# Patient Record
Sex: FEMALE | Race: WHITE | NOT HISPANIC OR LATINO | Employment: UNEMPLOYED | URBAN - METROPOLITAN AREA
[De-identification: names, ages, dates, MRNs, and addresses within clinical notes are randomized per-mention and may not be internally consistent; named-entity substitution may affect disease eponyms.]

---
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
## 2017-01-12 ENCOUNTER — ALLSCRIPTS OFFICE VISIT (OUTPATIENT)
Dept: OTHER | Facility: OTHER | Age: 22
End: 2017-01-12

## 2017-04-10 ENCOUNTER — ALLSCRIPTS OFFICE VISIT (OUTPATIENT)
Dept: OTHER | Facility: OTHER | Age: 22
End: 2017-04-10

## 2017-05-30 ENCOUNTER — HOSPITAL ENCOUNTER (EMERGENCY)
Facility: HOSPITAL | Age: 22
Discharge: HOME | End: 2017-05-30
Attending: EMERGENCY MEDICINE
Payer: COMMERCIAL

## 2017-05-30 ENCOUNTER — APPOINTMENT (EMERGENCY)
Dept: RADIOLOGY | Facility: HOSPITAL | Age: 22
End: 2017-05-30
Payer: COMMERCIAL

## 2017-05-30 VITALS
RESPIRATION RATE: 18 BRPM | SYSTOLIC BLOOD PRESSURE: 133 MMHG | BODY MASS INDEX: 25.96 KG/M2 | WEIGHT: 151.24 LBS | OXYGEN SATURATION: 99 % | DIASTOLIC BLOOD PRESSURE: 95 MMHG | HEART RATE: 85 BPM | TEMPERATURE: 97.6 F

## 2017-05-30 DIAGNOSIS — R11.2 NAUSEA VOMITING AND DIARRHEA: Primary | ICD-10-CM

## 2017-05-30 DIAGNOSIS — R19.7 NAUSEA VOMITING AND DIARRHEA: Primary | ICD-10-CM

## 2017-05-30 DIAGNOSIS — R10.33 ABDOMINAL PAIN, ACUTE, PERIUMBILICAL: ICD-10-CM

## 2017-05-30 DIAGNOSIS — N39.0 UTI (URINARY TRACT INFECTION): ICD-10-CM

## 2017-05-30 LAB
ALBUMIN SERPL BCP-MCNC: 4.5 G/DL (ref 3.5–5)
ALP SERPL-CCNC: 81 U/L (ref 46–116)
ALT SERPL W P-5'-P-CCNC: 22 U/L (ref 12–78)
AMORPH URATE CRY URNS QL MICRO: ABNORMAL /HPF
ANION GAP SERPL CALCULATED.3IONS-SCNC: 15 MMOL/L (ref 4–13)
AST SERPL W P-5'-P-CCNC: 17 U/L (ref 5–45)
BACTERIA UR QL AUTO: ABNORMAL /HPF
BASOPHILS # BLD AUTO: 0.02 THOUSANDS/ΜL (ref 0–0.1)
BASOPHILS NFR BLD AUTO: 0 % (ref 0–1)
BILIRUB SERPL-MCNC: 1.4 MG/DL (ref 0.2–1)
BILIRUB UR QL STRIP: ABNORMAL
BUN SERPL-MCNC: 24 MG/DL (ref 5–25)
CALCIUM SERPL-MCNC: 9.4 MG/DL (ref 8.3–10.1)
CHLORIDE SERPL-SCNC: 102 MMOL/L (ref 100–108)
CLARITY UR: ABNORMAL
CO2 SERPL-SCNC: 21 MMOL/L (ref 21–32)
COLOR UR: YELLOW
CREAT SERPL-MCNC: 1.01 MG/DL (ref 0.6–1.3)
EOSINOPHIL # BLD AUTO: 0.08 THOUSAND/ΜL (ref 0–0.61)
EOSINOPHIL NFR BLD AUTO: 1 % (ref 0–6)
ERYTHROCYTE [DISTWIDTH] IN BLOOD BY AUTOMATED COUNT: 12.9 % (ref 11.6–15.1)
GFR SERPL CREATININE-BSD FRML MDRD: >60 ML/MIN/1.73SQ M
GLUCOSE SERPL-MCNC: 130 MG/DL (ref 65–140)
GLUCOSE UR STRIP-MCNC: NEGATIVE MG/DL
HCG UR QL: NEGATIVE
HCT VFR BLD AUTO: 43.1 % (ref 34.8–46.1)
HGB BLD-MCNC: 15.5 G/DL (ref 11.5–15.4)
HGB UR QL STRIP.AUTO: NEGATIVE
KETONES UR STRIP-MCNC: ABNORMAL MG/DL
LEUKOCYTE ESTERASE UR QL STRIP: ABNORMAL
LIPASE SERPL-CCNC: 94 U/L (ref 73–393)
LYMPHOCYTES # BLD AUTO: 0.82 THOUSANDS/ΜL (ref 0.6–4.47)
LYMPHOCYTES NFR BLD AUTO: 5 % (ref 14–44)
MCH RBC QN AUTO: 30.2 PG (ref 26.8–34.3)
MCHC RBC AUTO-ENTMCNC: 36 G/DL (ref 31.4–37.4)
MCV RBC AUTO: 84 FL (ref 82–98)
MONOCYTES # BLD AUTO: 0.8 THOUSAND/ΜL (ref 0.17–1.22)
MONOCYTES NFR BLD AUTO: 5 % (ref 4–12)
NEUTROPHILS # BLD AUTO: 13.8 THOUSANDS/ΜL (ref 1.85–7.62)
NEUTS SEG NFR BLD AUTO: 89 % (ref 43–75)
NITRITE UR QL STRIP: NEGATIVE
NON-SQ EPI CELLS URNS QL MICRO: ABNORMAL /HPF
PH UR STRIP.AUTO: 5.5 [PH] (ref 4.5–8)
PLATELET # BLD AUTO: 224 THOUSANDS/UL (ref 149–390)
PMV BLD AUTO: 10.3 FL (ref 8.9–12.7)
POTASSIUM SERPL-SCNC: 3.5 MMOL/L (ref 3.5–5.3)
PROT SERPL-MCNC: 8 G/DL (ref 6.4–8.2)
PROT UR STRIP-MCNC: ABNORMAL MG/DL
RBC # BLD AUTO: 5.14 MILLION/UL (ref 3.81–5.12)
RBC #/AREA URNS AUTO: ABNORMAL /HPF
SODIUM SERPL-SCNC: 138 MMOL/L (ref 136–145)
SP GR UR STRIP.AUTO: >=1.03 (ref 1–1.03)
UROBILINOGEN UR QL STRIP.AUTO: 1 E.U./DL
WBC # BLD AUTO: 15.52 THOUSAND/UL (ref 4.31–10.16)
WBC #/AREA URNS AUTO: ABNORMAL /HPF

## 2017-05-30 PROCEDURE — 81001 URINALYSIS AUTO W/SCOPE: CPT | Performed by: EMERGENCY MEDICINE

## 2017-05-30 PROCEDURE — 80053 COMPREHEN METABOLIC PANEL: CPT | Performed by: EMERGENCY MEDICINE

## 2017-05-30 PROCEDURE — 83690 ASSAY OF LIPASE: CPT | Performed by: EMERGENCY MEDICINE

## 2017-05-30 PROCEDURE — 36415 COLL VENOUS BLD VENIPUNCTURE: CPT | Performed by: EMERGENCY MEDICINE

## 2017-05-30 PROCEDURE — 81025 URINE PREGNANCY TEST: CPT | Performed by: EMERGENCY MEDICINE

## 2017-05-30 PROCEDURE — 74022 RADEX COMPL AQT ABD SERIES: CPT

## 2017-05-30 PROCEDURE — 99284 EMERGENCY DEPT VISIT MOD MDM: CPT

## 2017-05-30 PROCEDURE — 87086 URINE CULTURE/COLONY COUNT: CPT | Performed by: EMERGENCY MEDICINE

## 2017-05-30 PROCEDURE — 96361 HYDRATE IV INFUSION ADD-ON: CPT

## 2017-05-30 PROCEDURE — 85025 COMPLETE CBC W/AUTO DIFF WBC: CPT | Performed by: EMERGENCY MEDICINE

## 2017-05-30 PROCEDURE — 96374 THER/PROPH/DIAG INJ IV PUSH: CPT

## 2017-05-30 RX ORDER — ONDANSETRON 2 MG/ML
4 INJECTION INTRAMUSCULAR; INTRAVENOUS ONCE
Status: COMPLETED | OUTPATIENT
Start: 2017-05-30 | End: 2017-05-30

## 2017-05-30 RX ORDER — DICYCLOMINE HCL 20 MG
40 TABLET ORAL ONCE
Status: COMPLETED | OUTPATIENT
Start: 2017-05-30 | End: 2017-05-30

## 2017-05-30 RX ORDER — DICYCLOMINE HCL 20 MG
20 TABLET ORAL 2 TIMES DAILY
Qty: 20 TABLET | Refills: 0 | Status: SHIPPED | OUTPATIENT
Start: 2017-05-30 | End: 2019-05-20

## 2017-05-30 RX ORDER — SULFAMETHOXAZOLE AND TRIMETHOPRIM 800; 160 MG/1; MG/1
1 TABLET ORAL 2 TIMES DAILY
Qty: 14 TABLET | Refills: 0 | Status: SHIPPED | OUTPATIENT
Start: 2017-05-30 | End: 2017-06-06

## 2017-05-30 RX ORDER — ONDANSETRON 4 MG/1
4 TABLET, ORALLY DISINTEGRATING ORAL EVERY 8 HOURS PRN
Qty: 20 TABLET | Refills: 0 | Status: SHIPPED | OUTPATIENT
Start: 2017-05-30 | End: 2019-02-05

## 2017-05-30 RX ADMIN — SODIUM CHLORIDE 1000 ML: 0.9 INJECTION, SOLUTION INTRAVENOUS at 01:44

## 2017-05-30 RX ADMIN — DICYCLOMINE HYDROCHLORIDE 40 MG: 20 TABLET ORAL at 01:43

## 2017-05-30 RX ADMIN — ONDANSETRON 4 MG: 2 INJECTION INTRAMUSCULAR; INTRAVENOUS at 01:44

## 2017-06-01 LAB — BACTERIA UR CULT: NORMAL

## 2018-01-12 NOTE — MISCELLANEOUS
Provider Comments  Provider Comments:   2nd no show  Left msg re: office policy and need to reschedule        Signatures   Electronically signed by : Donald Morillo MD; Jan 12 2017  3:29PM EST                       (Author)

## 2018-01-15 NOTE — MISCELLANEOUS
Provider Comments  Provider Comments:     3rd no show despite verbal reminders  Will call, clarify reason for no show and discuss office policy and need to reschedule  WIll also send letter  Another no show will result in discharge from practice          Signatures   Electronically signed by : Michael Paul MD; Apr 11 2017  9:15AM EST                       (Author)

## 2018-01-18 NOTE — MISCELLANEOUS
Provider Comments  Provider Comments:   No show  Left msg re: office policy and need to reschedule        Signatures   Electronically signed by : Nohemi Hugo MD; Aug 25 2016  1:40PM EST                       (Author)

## 2018-07-03 ENCOUNTER — TELEPHONE (OUTPATIENT)
Dept: INTERNAL MEDICINE CLINIC | Facility: CLINIC | Age: 23
End: 2018-07-03

## 2018-09-29 ENCOUNTER — OFFICE VISIT (OUTPATIENT)
Dept: URGENT CARE | Facility: CLINIC | Age: 23
End: 2018-09-29
Payer: COMMERCIAL

## 2018-09-29 VITALS
DIASTOLIC BLOOD PRESSURE: 71 MMHG | OXYGEN SATURATION: 97 % | RESPIRATION RATE: 16 BRPM | SYSTOLIC BLOOD PRESSURE: 122 MMHG | HEIGHT: 64 IN | HEART RATE: 88 BPM | TEMPERATURE: 98.7 F | WEIGHT: 140 LBS | BODY MASS INDEX: 23.9 KG/M2

## 2018-09-29 DIAGNOSIS — J20.9 ACUTE BRONCHITIS, UNSPECIFIED ORGANISM: Primary | ICD-10-CM

## 2018-09-29 PROCEDURE — G0382 LEV 3 HOSP TYPE B ED VISIT: HCPCS | Performed by: PHYSICIAN ASSISTANT

## 2018-09-29 PROCEDURE — 99283 EMERGENCY DEPT VISIT LOW MDM: CPT | Performed by: PHYSICIAN ASSISTANT

## 2018-09-29 RX ORDER — AZITHROMYCIN 250 MG/1
TABLET, FILM COATED ORAL
Qty: 6 TABLET | Refills: 0 | Status: SHIPPED | OUTPATIENT
Start: 2018-09-29 | End: 2018-10-03

## 2018-09-29 RX ORDER — BUPROPION HYDROCHLORIDE 150 MG/1
TABLET, EXTENDED RELEASE ORAL
COMMUNITY
Start: 2016-07-26 | End: 2019-05-20

## 2018-09-29 NOTE — PATIENT INSTRUCTIONS

## 2018-09-29 NOTE — PROGRESS NOTES
[facility]        NAME: Lisette Colin is a 20 y o  female  : 1995    MRN: [id number]  DATE: 2018  TIME: 2:43 PM    Assessment and Plan   Acute bronchitis, unspecified organism [J20 9]  1  Acute bronchitis, unspecified organism  azithromycin (ZITHROMAX) 250 mg tablet         Patient Instructions       Follow up with PCP in 3-5 days  Proceed to  ER if symptoms worsen  Chief Complaint     Chief Complaint   Patient presents with    Cough     x 3 weeks    Cold Like Symptoms         History of Present Illness       Patient states she has been having cold symptoms off and on for the past 3 weeks  Yesterday she developed a cough which is now productive of yellow greenish mucus as chest pain with coughing only and feels as chest tightness  She states she has bronchitis usually run October every year  Review of Systems   Review of Systems   Constitutional: Negative for chills and fever  HENT: Positive for congestion  Negative for ear pain, postnasal drip, rhinorrhea, sinus pressure, sore throat and trouble swallowing  Eyes: Negative for redness  Respiratory: Positive for cough and chest tightness  Negative for shortness of breath and wheezing  Cardiovascular: Positive for chest pain (Coughing only)  Gastrointestinal: Negative for abdominal pain, diarrhea, nausea and vomiting  Musculoskeletal: Negative for myalgias  Skin: Negative for rash  Neurological: Negative for dizziness and headaches  Hematological: Negative for adenopathy           Current Medications       Current Outpatient Prescriptions:     azithromycin (ZITHROMAX) 250 mg tablet, Take 2 tablets today then 1 tablet daily x 4 days, Disp: 6 tablet, Rfl: 0    buPROPion (ZYBAN) 150 MG 12 hr tablet, Take by mouth, Disp: , Rfl:     dicyclomine (BENTYL) 20 mg tablet, Take 1 tablet by mouth 2 (two) times a day (Patient not taking: Reported on 2018 ), Disp: 20 tablet, Rfl: 0    ondansetron (ZOFRAN-ODT) 4 mg disintegrating tablet, Take 1 tablet by mouth every 8 (eight) hours as needed for nausea or vomiting (Patient not taking: Reported on 9/29/2018 ), Disp: 20 tablet, Rfl: 0    Current Allergies     Allergies as of 09/29/2018 - Reviewed 09/29/2018   Allergen Reaction Noted    Morphine and related Hives 10/09/2016            The following portions of the patient's history were reviewed and updated as appropriate: allergies, current medications, past family history, past medical history, past social history, past surgical history and problem list      Past Medical History:   Diagnosis Date    [facility]     age 7       Past Surgical History:   Procedure Laterality Date    ELBOW SURGERY      TONSILECTOMY AND ADNOIDECTOMY         History reviewed  No pertinent family history  Medications have been verified  Objective   /71   Pulse 88   Temp 98 7 °F (37 1 °C)   Resp 16   Ht 5' 4" (1 626 m)   Wt 63 5 kg (140 lb)   SpO2 97%   BMI 24 03 kg/m²        Physical Exam     Physical Exam   Constitutional: She is oriented to person, place, and time  She appears well-developed and well-nourished  HENT:   Head: Normocephalic and atraumatic  Right Ear: External ear normal    Left Ear: External ear normal    Nose: Nose normal    Mouth/Throat: Oropharynx is clear and moist    Eyes: Conjunctivae are normal    Neck: Neck supple  Cardiovascular: Normal rate, regular rhythm and normal heart sounds  Pulmonary/Chest: Effort normal    Lymphadenopathy:     She has no cervical adenopathy  Neurological: She is alert and oriented to person, place, and time  Skin: Skin is warm and dry  No rash noted  Psychiatric: She has a normal mood and affect  Her behavior is normal  Judgment and thought content normal    Nursing note and vitals reviewed

## 2019-01-30 ENCOUNTER — OFFICE VISIT (OUTPATIENT)
Dept: URGENT CARE | Facility: CLINIC | Age: 24
End: 2019-01-30
Payer: COMMERCIAL

## 2019-01-30 VITALS
OXYGEN SATURATION: 99 % | SYSTOLIC BLOOD PRESSURE: 132 MMHG | BODY MASS INDEX: 23.9 KG/M2 | TEMPERATURE: 97.8 F | WEIGHT: 140 LBS | HEART RATE: 86 BPM | DIASTOLIC BLOOD PRESSURE: 75 MMHG | RESPIRATION RATE: 16 BRPM | HEIGHT: 64 IN

## 2019-01-30 DIAGNOSIS — J20.9 ACUTE BRONCHITIS, UNSPECIFIED ORGANISM: Primary | ICD-10-CM

## 2019-01-30 PROCEDURE — 99213 OFFICE O/P EST LOW 20 MIN: CPT | Performed by: PHYSICIAN ASSISTANT

## 2019-01-30 PROCEDURE — 99283 EMERGENCY DEPT VISIT LOW MDM: CPT | Performed by: PHYSICIAN ASSISTANT

## 2019-01-30 PROCEDURE — G0382 LEV 3 HOSP TYPE B ED VISIT: HCPCS | Performed by: PHYSICIAN ASSISTANT

## 2019-01-30 RX ORDER — ALBUTEROL SULFATE 90 UG/1
2 AEROSOL, METERED RESPIRATORY (INHALATION) EVERY 4 HOURS PRN
Qty: 18 G | Refills: 0 | Status: SHIPPED | OUTPATIENT
Start: 2019-01-30 | End: 2019-02-05

## 2019-01-30 NOTE — PATIENT INSTRUCTIONS
Infection appears viral   Recommend symptomatic treatment  Can take ibuprofen or tylenol as needed for pain or fever  Over the counter cough and cold medications to help with symptoms  Use salt water gargles for sore throat and throat lozenges  Cough drops as needed  Wash hands frequently to prevent the spread of infection  If not improving over the next 7-10 days, follow up with PCP  Symptoms may persist for 10-14 days  Can use albuterol inhaler as needed

## 2019-01-30 NOTE — PROGRESS NOTES
[facility]    NAME: Murray Jasso is a 24 y o  female  : 1995    MRN: [id number]  DATE: 2019  TIME: 3:55 PM    Assessment and Plan   Acute bronchitis, unspecified organism [J20 9]  1  Acute bronchitis, unspecified organism  albuterol (VENTOLIN HFA) 90 mcg/act inhaler       Patient Instructions     Patient Instructions   Infection appears viral   Recommend symptomatic treatment  Can take ibuprofen or tylenol as needed for pain or fever  Over the counter cough and cold medications to help with symptoms  Use salt water gargles for sore throat and throat lozenges  Cough drops as needed  Wash hands frequently to prevent the spread of infection  If not improving over the next 7-10 days, follow up with PCP  Symptoms may persist for 10-14 days  Can use albuterol inhaler as needed  Chief Complaint     Chief Complaint   Patient presents with    Pain With Breathing     x 2 days    Cough     x several months       History of Present Illness   19-year-old female here with cough for the last 2 days  Denies any wheezing or shortness of breath  Cough is dry and nonproductive  No fever or chills  No sore throat or nasal congestion  Has a history of bronchitis  Review of Systems   Review of Systems   Constitutional: Negative for activity change, appetite change, chills, diaphoresis, fatigue, fever and unexpected weight change  HENT: Negative for congestion, dental problem, hearing loss, sinus pressure, sneezing, sore throat, tinnitus, trouble swallowing and voice change  Eyes: Negative for photophobia, redness and visual disturbance  Respiratory: Positive for cough  Negative for apnea, chest tightness, shortness of breath, wheezing and stridor  Cardiovascular: Negative for chest pain, palpitations and leg swelling  Gastrointestinal: Negative for abdominal distention, abdominal pain, blood in stool, constipation, diarrhea, nausea and vomiting     Endocrine: Negative for cold intolerance, heat intolerance, polydipsia, polyphagia and polyuria  Genitourinary: Negative for difficulty urinating, dysuria, flank pain, frequency, hematuria and urgency  Musculoskeletal: Negative for arthralgias, back pain, gait problem, joint swelling, myalgias, neck pain and neck stiffness  Skin: Negative for pallor, rash and wound  Neurological: Negative for dizziness, tremors, seizures, speech difficulty, weakness and headaches  Hematological: Negative for adenopathy  Does not bruise/bleed easily  Psychiatric/Behavioral: Negative for agitation, confusion, dysphoric mood and sleep disturbance  The patient is not nervous/anxious  All other systems reviewed and are negative        Current Medications     Current Outpatient Prescriptions:     albuterol (VENTOLIN HFA) 90 mcg/act inhaler, Inhale 2 puffs every 4 (four) hours as needed for wheezing or shortness of breath, Disp: 18 g, Rfl: 0    buPROPion (ZYBAN) 150 MG 12 hr tablet, Take by mouth, Disp: , Rfl:     dicyclomine (BENTYL) 20 mg tablet, Take 1 tablet by mouth 2 (two) times a day (Patient not taking: Reported on 9/29/2018 ), Disp: 20 tablet, Rfl: 0    ondansetron (ZOFRAN-ODT) 4 mg disintegrating tablet, Take 1 tablet by mouth every 8 (eight) hours as needed for nausea or vomiting (Patient not taking: Reported on 9/29/2018 ), Disp: 20 tablet, Rfl: 0    Current Allergies     Allergies as of 01/30/2019 - Reviewed 01/30/2019   Allergen Reaction Noted    Morphine and related Hives 10/09/2016          The following portions of the patient's history were reviewed and updated as appropriate: allergies, current medications, past family history, past medical history, past social history, past surgical history and problem list    Past Medical History:   Diagnosis Date    [facility] infection) ([facility] )     age 7     Past Surgical History:   Procedure Laterality Date    ELBOW SURGERY      TONSILECTOMY AND ADNOIDECTOMY History reviewed  No pertinent family history  Social History     Social History    Marital status: Single     Spouse name: N/A    Number of children: 1    Years of education: N/A     Occupational History    Not on file  Social History Main Topics    Smoking status: Current Every Day Smoker     Packs/day: 1 00     Types: Cigarettes    Smokeless tobacco: Former User     Quit date: 8/7/2016      Comment: Family members smoke outdoors    Alcohol use Yes      Comment: Social/ Per [employer]: 3 beers daily    Drug use: No    Sexual activity: No     Other Topics Concern    Not on file     Social History Narrative    Always uses seatbelt    Caffeine use, 6-8 servings daily    Father has high school/GED    Mother has high school/GED    In Graduate school    Lack exercise    Pets: Reptile    Poorly balanced diet             Medications have been verified  Objective   /75   Pulse 86   Temp 97 8 °F (36 6 °C)   Resp 16   Ht 5' 4" (1 626 m)   Wt 63 5 kg (140 lb)   SpO2 99%   BMI 24 03 kg/m²      Physical Exam   Physical Exam   Constitutional: She appears well-developed and well-nourished  No distress  HENT:   Head: Normocephalic  Right Ear: External ear normal    Left Ear: External ear normal    Nose: Nose normal    Mouth/Throat: Oropharynx is clear and moist  No oropharyngeal exudate  Neck: Normal range of motion  Neck supple  Cardiovascular: Normal rate, regular rhythm and normal heart sounds  No murmur heard  Pulmonary/Chest: Effort normal and breath sounds normal  No respiratory distress  She has no wheezes  She has no rales  Abdominal: Soft  Bowel sounds are normal  There is no tenderness  Musculoskeletal: Normal range of motion  Lymphadenopathy:     She has no cervical adenopathy  Skin: Skin is warm  No rash noted  Nursing note and vitals reviewed

## 2019-01-30 NOTE — LETTER
January 30, 2019     Patient: Tyson Phipps   YOB: 1995   Date of Visit: 1/30/2019       To Whom It May Concern: It is my medical opinion that Tyson Phipps can return to work on 1/31/19  Excuse related absence on 1/29/19  If you have any questions or concerns, please don't hesitate to call           Sincerely,        Rafita Cardoso PA-C    CC: Tyson Phipps

## 2019-02-05 ENCOUNTER — OFFICE VISIT (OUTPATIENT)
Dept: URGENT CARE | Facility: CLINIC | Age: 24
End: 2019-02-05
Payer: COMMERCIAL

## 2019-02-05 VITALS
HEIGHT: 64 IN | RESPIRATION RATE: 16 BRPM | TEMPERATURE: 99.2 F | OXYGEN SATURATION: 99 % | SYSTOLIC BLOOD PRESSURE: 122 MMHG | BODY MASS INDEX: 23.9 KG/M2 | WEIGHT: 140 LBS | HEART RATE: 90 BPM | DIASTOLIC BLOOD PRESSURE: 82 MMHG

## 2019-02-05 DIAGNOSIS — J01.90 ACUTE SINUSITIS, RECURRENCE NOT SPECIFIED, UNSPECIFIED LOCATION: Primary | ICD-10-CM

## 2019-02-05 PROCEDURE — 99203 OFFICE O/P NEW LOW 30 MIN: CPT | Performed by: PHYSICIAN ASSISTANT

## 2019-02-05 PROCEDURE — 99283 EMERGENCY DEPT VISIT LOW MDM: CPT | Performed by: PHYSICIAN ASSISTANT

## 2019-02-05 PROCEDURE — G0382 LEV 3 HOSP TYPE B ED VISIT: HCPCS | Performed by: PHYSICIAN ASSISTANT

## 2019-02-05 RX ORDER — AMOXICILLIN AND CLAVULANATE POTASSIUM 875; 125 MG/1; MG/1
1 TABLET, FILM COATED ORAL EVERY 12 HOURS SCHEDULED
Qty: 20 TABLET | Refills: 0 | Status: SHIPPED | OUTPATIENT
Start: 2019-02-05 | End: 2019-02-15

## 2019-02-05 NOTE — PROGRESS NOTES
[facility]        NAME: Harini Wilkins is a 24 y o  female  : 1995    MRN: [id number]  DATE: 2019  TIME: 12:32 PM    Assessment and Plan   Acute sinusitis, recurrence not specified, unspecified location [J01 90]  1  Acute sinusitis, recurrence not specified, unspecified location  amoxicillin-clavulanate (AUGMENTIN) 875-125 mg per tablet         Patient Instructions       Follow up with PCP in 3-5 days  Proceed to  ER if symptoms worsen  Chief Complaint     Chief Complaint   Patient presents with    Cold Like Symptoms     C/o sinus pain and pressure, post nasal drip, productive cough  x 1 week  Pt did not have the flu vaccine  History of Present Illness       78-year-old female presents with sinus pain, pressure, postnasal drip, cough for 1 week  Patient did not receive flu vaccine  She denies any fever body aches  Denies a history of asthma or bronchitis  Has been taking Robitussin over-the-counter  Review of Systems   Review of Systems   Constitutional: Negative for chills, fatigue and fever  HENT: Positive for congestion, postnasal drip, sinus pain and sinus pressure  Negative for ear pain, sore throat and trouble swallowing  Eyes: Negative for pain, discharge and redness  Respiratory: Positive for cough  Negative for chest tightness, shortness of breath and wheezing  Cardiovascular: Negative for chest pain, palpitations and leg swelling  Gastrointestinal: Negative for abdominal pain, diarrhea, nausea and vomiting  Musculoskeletal: Negative for arthralgias, joint swelling and myalgias  Skin: Negative for rash  Neurological: Negative for dizziness, weakness, numbness and headaches           Current Medications       Current Outpatient Prescriptions:     amoxicillin-clavulanate (AUGMENTIN) 875-125 mg per tablet, Take 1 tablet by mouth every 12 (twelve) hours for 10 days, Disp: 20 tablet, Rfl: 0    buPROPion (ZYBAN) 150 MG 12 hr tablet, Take by mouth, Disp: , Rfl:     dicyclomine (BENTYL) 20 mg tablet, Take 1 tablet by mouth 2 (two) times a day (Patient not taking: Reported on 9/29/2018 ), Disp: 20 tablet, Rfl: 0    Current Allergies     Allergies as of 02/05/2019 - Reviewed 02/05/2019   Allergen Reaction Noted    Morphine and related Hives 10/09/2016            The following portions of the patient's history were reviewed and updated as appropriate: allergies, current medications, past family history, past medical history, past social history, past surgical history and problem list      Past Medical History:   Diagnosis Date    CMV [facility]cytomegalovirus infection) ([facility] )     age 7       Past Surgical History:   Procedure Laterality Date    ELBOW SURGERY      TONSILECTOMY AND ADNOIDECTOMY         No family history on file  Medications have been verified  Objective   /82   Pulse 90   Temp 99 2 °F (37 3 °C) (Tympanic)   Resp 16   Ht 5' 4" (1 626 m)   Wt 63 5 kg (140 lb)   LMP 01/21/2019 (Approximate)   SpO2 99%   BMI 24 03 kg/m²        Physical Exam     Physical Exam   Constitutional: She is oriented to person, place, and time  She appears well-developed and well-nourished  No distress  HENT:   Head: Normocephalic  Right Ear: Tympanic membrane and external ear normal    Left Ear: Tympanic membrane and external ear normal    Nose: Mucosal edema present  Right sinus exhibits frontal sinus tenderness  Left sinus exhibits frontal sinus tenderness  Mouth/Throat: Mucous membranes are normal  Posterior oropharyngeal erythema present  Eyes: Pupils are equal, round, and reactive to light  Conjunctivae and EOM are normal    Neck: Normal range of motion  Neck supple  Cardiovascular: Normal rate, regular rhythm and normal heart sounds  No murmur heard  Pulmonary/Chest: Effort normal and breath sounds normal  No respiratory distress  She has no wheezes  Abdominal: Soft  Bowel sounds are normal  There is no tenderness  Musculoskeletal: Normal range of motion  Lymphadenopathy:     She has no cervical adenopathy  Neurological: She is alert and oriented to person, place, and time  She has normal reflexes  Skin: Skin is warm and dry  Psychiatric: She has a normal mood and affect  Nursing note and vitals reviewed

## 2019-04-09 ENCOUNTER — OFFICE VISIT (OUTPATIENT)
Dept: URGENT CARE | Facility: CLINIC | Age: 24
End: 2019-04-09
Payer: COMMERCIAL

## 2019-04-09 VITALS
DIASTOLIC BLOOD PRESSURE: 72 MMHG | HEART RATE: 86 BPM | TEMPERATURE: 99.6 F | OXYGEN SATURATION: 99 % | RESPIRATION RATE: 18 BRPM | SYSTOLIC BLOOD PRESSURE: 121 MMHG

## 2019-04-09 DIAGNOSIS — J06.9 BACTERIAL URI: Primary | ICD-10-CM

## 2019-04-09 DIAGNOSIS — B96.89 BACTERIAL URI: Primary | ICD-10-CM

## 2019-04-09 PROCEDURE — 99283 EMERGENCY DEPT VISIT LOW MDM: CPT | Performed by: NURSE PRACTITIONER

## 2019-04-09 PROCEDURE — 99213 OFFICE O/P EST LOW 20 MIN: CPT | Performed by: NURSE PRACTITIONER

## 2019-04-09 PROCEDURE — G0382 LEV 3 HOSP TYPE B ED VISIT: HCPCS | Performed by: NURSE PRACTITIONER

## 2019-04-09 RX ORDER — PREDNISONE 20 MG/1
20 TABLET ORAL 2 TIMES DAILY WITH MEALS
Qty: 10 TABLET | Refills: 0 | Status: SHIPPED | OUTPATIENT
Start: 2019-04-09 | End: 2019-04-14

## 2019-04-09 RX ORDER — AZITHROMYCIN 250 MG/1
TABLET, FILM COATED ORAL
Qty: 6 TABLET | Refills: 0 | Status: SHIPPED | OUTPATIENT
Start: 2019-04-09 | End: 2019-04-14

## 2019-05-20 ENCOUNTER — OFFICE VISIT (OUTPATIENT)
Dept: URGENT CARE | Age: 24
End: 2019-05-20
Payer: COMMERCIAL

## 2019-05-20 VITALS
DIASTOLIC BLOOD PRESSURE: 87 MMHG | WEIGHT: 153 LBS | TEMPERATURE: 99.4 F | OXYGEN SATURATION: 100 % | RESPIRATION RATE: 18 BRPM | BODY MASS INDEX: 26.12 KG/M2 | HEART RATE: 96 BPM | SYSTOLIC BLOOD PRESSURE: 139 MMHG | HEIGHT: 64 IN

## 2019-05-20 DIAGNOSIS — J06.9 ACUTE URI: Primary | ICD-10-CM

## 2019-05-20 PROCEDURE — G0382 LEV 3 HOSP TYPE B ED VISIT: HCPCS | Performed by: PHYSICIAN ASSISTANT

## 2019-05-20 PROCEDURE — 99203 OFFICE O/P NEW LOW 30 MIN: CPT | Performed by: PHYSICIAN ASSISTANT

## 2019-05-20 PROCEDURE — 99283 EMERGENCY DEPT VISIT LOW MDM: CPT | Performed by: PHYSICIAN ASSISTANT

## 2019-05-20 RX ORDER — BROMPHENIRAMINE MALEATE, PSEUDOEPHEDRINE HYDROCHLORIDE, AND DEXTROMETHORPHAN HYDROBROMIDE 2; 30; 10 MG/5ML; MG/5ML; MG/5ML
5 SYRUP ORAL 4 TIMES DAILY PRN
Qty: 120 ML | Refills: 0 | Status: SHIPPED | OUTPATIENT
Start: 2019-05-20 | End: 2019-08-27

## 2019-05-20 RX ORDER — FLUTICASONE PROPIONATE 50 MCG
1 SPRAY, SUSPENSION (ML) NASAL DAILY
Qty: 16 G | Refills: 0 | Status: SHIPPED | OUTPATIENT
Start: 2019-05-20 | End: 2019-08-27

## 2019-08-17 ENCOUNTER — OFFICE VISIT (OUTPATIENT)
Dept: URGENT CARE | Facility: CLINIC | Age: 24
End: 2019-08-17
Payer: COMMERCIAL

## 2019-08-17 ENCOUNTER — APPOINTMENT (OUTPATIENT)
Dept: RADIOLOGY | Facility: CLINIC | Age: 24
End: 2019-08-17
Payer: COMMERCIAL

## 2019-08-17 VITALS
WEIGHT: 153 LBS | DIASTOLIC BLOOD PRESSURE: 68 MMHG | SYSTOLIC BLOOD PRESSURE: 124 MMHG | TEMPERATURE: 98.9 F | HEIGHT: 64 IN | RESPIRATION RATE: 16 BRPM | BODY MASS INDEX: 26.12 KG/M2 | HEART RATE: 89 BPM | OXYGEN SATURATION: 99 %

## 2019-08-17 DIAGNOSIS — M54.41 ACUTE BILATERAL LOW BACK PAIN WITH RIGHT-SIDED SCIATICA: Primary | ICD-10-CM

## 2019-08-17 DIAGNOSIS — M54.41 ACUTE BILATERAL LOW BACK PAIN WITH RIGHT-SIDED SCIATICA: ICD-10-CM

## 2019-08-17 PROCEDURE — 72110 X-RAY EXAM L-2 SPINE 4/>VWS: CPT

## 2019-08-17 PROCEDURE — 99284 EMERGENCY DEPT VISIT MOD MDM: CPT | Performed by: NURSE PRACTITIONER

## 2019-08-17 PROCEDURE — 99214 OFFICE O/P EST MOD 30 MIN: CPT | Performed by: NURSE PRACTITIONER

## 2019-08-17 PROCEDURE — G0383 LEV 4 HOSP TYPE B ED VISIT: HCPCS | Performed by: NURSE PRACTITIONER

## 2019-08-17 RX ORDER — NAPROXEN 500 MG/1
500 TABLET ORAL 2 TIMES DAILY WITH MEALS
Qty: 28 TABLET | Refills: 0 | Status: SHIPPED | OUTPATIENT
Start: 2019-08-17 | End: 2019-08-27

## 2019-08-17 NOTE — PROGRESS NOTES
[facility]        NAME: Alvarez Gallo is a 24 y o  female  : 1995    MRN: [id number]  DATE: 2019  TIME: 4:46 PM    Assessment and Plan   Acute bilateral low back pain with right-sided sciatica [M54 41]  1  Acute bilateral low back pain with right-sided sciatica  XR spine lumbar minimum 4 views non injury    Ambulatory Referral to Comprehensive Spine Program    naproxen (NAPROSYN) 500 mg tablet         Patient Instructions     Patient Instructions   Take the naproxen (aleve) 2x/day with food  You will have better pain relief if taken consistently for 1-2 weeks, but it is not a maintenance medication, so it should not be taken regularly for longer than 2 weeks because of risk of GI irritation  If you prefer, it is also ok to take it as just as needed every 12 hours  Heat (20-25 min every 3-4 hours) will also help relax the muscles  Follow-up with the comprehensive spine program for further assessment and treatment  Acute Low Back Pain, Ambulatory Care   GENERAL INFORMATION:   Acute low back pain  is discomfort in your lower back area that lasts for less than 12 weeks  The word acute is used to describe pain that starts suddenly, worsens quickly, and lasts for a short time    Common symptoms include the following:   · Back stiffness or spasms    · Pain down the back or side of one leg    · Holding yourself in an unusual position or posture to decrease your back pain    · Not being able to find a sitting position that is comfortable    · Slow increase in your pain for 24 to 48 hours after you stress your back    · Tenderness on your lower back or severe pain when you move your back  Seek immediate care for the following symptoms:   · Severe pain    · Sudden stiffness and heaviness in both buttocks down to both legs    · Numbness or weakness in one leg, or pain in both legs    · Numbness in your genital area or across your lower back    · Unable to control your urine or bowel movements  Treatment for acute low back pain  may include any of the following:  · Medicines:      ¨ NSAIDs  help decrease swelling and pain or fever  This medicine is available with or without a doctor's order  NSAIDs can cause stomach bleeding or kidney problems in certain people  If you take blood thinner medicine, always ask your healthcare provider if NSAIDs are safe for you  Always read the medicine label and follow directions  ¨ Muscle relaxers  help decrease muscle spasms pain  ¨ Prescription pain medicine  may be given  Ask how to take this medicine safely  · Surgery  may be needed if your pain is severe and other treatments do not work  Surgery may be needed for conditions of the lumbar spine, such as herniated disc or spinal stenosis  Manage your symptoms:   · Sleep on a firm mattress  If you do not have a firm mattress, have someone move your mattress to the floor for a few days  A piece of plywood under your mattress can also help make it firmer  · Apply ice  on your lower back for 15 to 20 minutes every hour or as directed  Use an ice pack, or put crushed ice in a plastic bag  Cover it with a towel  Ice helps prevent tissue damage and decreases swelling and pain  You can alternate ice and heat  · Apply heat  on your lower back for 20 to 30 minutes every 2 hours for as many days as directed  Heat helps decrease pain and muscle spasms  · Go to physical therapy  A physical therapist teaches you exercises to help improve movement and strength, and to decrease pain  Prevent acute low back pain:   · Use proper body mechanics  ¨ Bend at the hips and knees when you  objects  Do not bend from the waist  Use your leg muscles as you lift the load  Do not use your back  Keep the object close to your chest as you lift it  Try not to twist or lift anything above your waist     ¨ Change your position often when you stand for long periods of time   Rest one foot on a small box or footrest, and then switch to the other foot often  ¨ Try not to sit for long periods of time  When you do, sit in a straight-backed chair with your feet flat on the floor  Never reach, pull, or push while you are sitting  · Exercise regularly  Warm up before you exercise  Do exercises that strengthen your back muscles  Ask about the best exercise plan for you  · Maintain a healthy weight  Ask your healthcare provider how much you should weigh  Ask him to help you create a weight loss plan if you are overweight  Follow up with your healthcare provider as directed:  Return for a follow-up visit if you still have pain after 1 to 3 weeks of treatment  You may need to visit an orthopedist if your back pain lasts more than 6 to 12 weeks  Write down your questions so you remember to ask them during your visits  CARE AGREEMENT:   You have the right to help plan your care  Learn about your health condition and how it may be treated  Discuss treatment options with your caregivers to decide what care you want to receive  You always have the right to refuse treatment  The above information is an  only  It is not intended as medical advice for individual conditions or treatments  Talk to your doctor, nurse or pharmacist before following any medical regimen to see if it is safe and effective for you  © 2014 [address] is for End User's use only and may not be sold, redistributed or otherwise used for commercial purposes  All illustrations and images included in CareNotes® are the copyrighted property of [employer]  or Johan Leyva  Follow up with PCP in 3-5 days  Proceed to  ER if symptoms worsen  Chief Complaint     Chief Complaint   Patient presents with    Back Pain     x 2 months         History of Present Illness       Patient complaining of low back pain x2 months  No prior back problems or injury    No specific mechanism of injury to prompt this pain  She states the severity waxes and wanes but when it is bad sometimes it will shoot down her right hip  She states the severity is high like that, it is difficult to get out of her car  She states she does not like to take medications so she has not taken any regular medications treat the symptoms  Since it has not gone away, she presents here to be seen  She rates her pain today at 6/10  She declines the offer of Decadron or Toradol  Review of Systems   Review of Systems   Musculoskeletal: Positive for back pain  All other systems reviewed and are negative  Current Medications       Current Outpatient Medications:     brompheniramine-pseudoephedrine-DM 30-2-10 MG/5ML syrup, Take 5 mL by mouth 4 (four) times a day as needed for congestion (Patient not taking: Reported on 8/17/2019), Disp: 120 mL, Rfl: 0    fluticasone (FLONASE) 50 mcg/act nasal spray, 1 spray into each nostril daily (Patient not taking: Reported on 8/17/2019), Disp: 16 g, Rfl: 0    naproxen (NAPROSYN) 500 mg tablet, Take 1 tablet (500 mg total) by mouth 2 (two) times a day with meals for 14 days, Disp: 28 tablet, Rfl: 0    Current Allergies     Allergies as of 08/17/2019 - Reviewed 08/17/2019   Allergen Reaction Noted    Morphine and related Hives 10/09/2016            The following portions of the patient's history were reviewed and updated as appropriate: allergies, current medications, past family history, past medical history, past social history, past surgical history and problem list      Past Medical History:   Diagnosis Date    [facility]     age 7       Past Surgical History:   Procedure Laterality Date    ELBOW SURGERY      TONSILECTOMY AND ADNOIDECTOMY         History reviewed  No pertinent family history  Medications have been verified          Objective   /68   Pulse 89   Temp 98 9 °F (37 2 °C)   Resp 16   Ht 5' 4" (1 626 m)   Wt 69 4 kg (153 lb) SpO2 99%   BMI 26 26 kg/m²        Physical Exam     Physical Exam   Constitutional: She is oriented to person, place, and time  She appears well-developed and well-nourished  No distress  HENT:   Head: Normocephalic and atraumatic  Eyes: Pupils are equal, round, and reactive to light  Neck: Normal range of motion  Neck supple  Pulmonary/Chest: Effort normal  No respiratory distress  Abdominal: Soft  She exhibits no distension  Musculoskeletal: Normal range of motion  Right hip: Normal         Left hip: Normal         Cervical back: Normal         Thoracic back: Normal         Lumbar back: She exhibits tenderness, bony tenderness and pain  She exhibits normal range of motion, no swelling, no edema, no deformity, no laceration, no spasm and normal pulse  No sciatic pain today   Neurological: She is alert and oriented to person, place, and time  Skin: Skin is warm and dry  Capillary refill takes less than 2 seconds  She is not diaphoretic  Psychiatric: She has a normal mood and affect  Her behavior is normal  Judgment and thought content normal    Nursing note and vitals reviewed

## 2019-08-17 NOTE — PATIENT INSTRUCTIONS
Take the naproxen (aleve) 2x/day with food  You will have better pain relief if taken consistently for 1-2 weeks, but it is not a maintenance medication, so it should not be taken regularly for longer than 2 weeks because of risk of GI irritation  If you prefer, it is also ok to take it as just as needed every 12 hours  Heat (20-25 min every 3-4 hours) will also help relax the muscles  Follow-up with the comprehensive spine program for further assessment and treatment  Acute Low Back Pain, Ambulatory Care   GENERAL INFORMATION:   Acute low back pain  is discomfort in your lower back area that lasts for less than 12 weeks  The word acute is used to describe pain that starts suddenly, worsens quickly, and lasts for a short time  Common symptoms include the following:   · Back stiffness or spasms    · Pain down the back or side of one leg    · Holding yourself in an unusual position or posture to decrease your back pain    · Not being able to find a sitting position that is comfortable    · Slow increase in your pain for 24 to 48 hours after you stress your back    · Tenderness on your lower back or severe pain when you move your back  Seek immediate care for the following symptoms:   · Severe pain    · Sudden stiffness and heaviness in both buttocks down to both legs    · Numbness or weakness in one leg, or pain in both legs    · Numbness in your genital area or across your lower back    · Unable to control your urine or bowel movements  Treatment for acute low back pain  may include any of the following:  · Medicines:      ¨ NSAIDs  help decrease swelling and pain or fever  This medicine is available with or without a doctor's order  NSAIDs can cause stomach bleeding or kidney problems in certain people  If you take blood thinner medicine, always ask your healthcare provider if NSAIDs are safe for you  Always read the medicine label and follow directions      ¨ Muscle relaxers  help decrease muscle spasms pain     ¨ Prescription pain medicine  may be given  Ask how to take this medicine safely  · Surgery  may be needed if your pain is severe and other treatments do not work  Surgery may be needed for conditions of the lumbar spine, such as herniated disc or spinal stenosis  Manage your symptoms:   · Sleep on a firm mattress  If you do not have a firm mattress, have someone move your mattress to the floor for a few days  A piece of plywood under your mattress can also help make it firmer  · Apply ice  on your lower back for 15 to 20 minutes every hour or as directed  Use an ice pack, or put crushed ice in a plastic bag  Cover it with a towel  Ice helps prevent tissue damage and decreases swelling and pain  You can alternate ice and heat  · Apply heat  on your lower back for 20 to 30 minutes every 2 hours for as many days as directed  Heat helps decrease pain and muscle spasms  · Go to physical therapy  A physical therapist teaches you exercises to help improve movement and strength, and to decrease pain  Prevent acute low back pain:   · Use proper body mechanics  ¨ Bend at the hips and knees when you  objects  Do not bend from the waist  Use your leg muscles as you lift the load  Do not use your back  Keep the object close to your chest as you lift it  Try not to twist or lift anything above your waist     ¨ Change your position often when you stand for long periods of time  Rest one foot on a small box or footrest, and then switch to the other foot often  ¨ Try not to sit for long periods of time  When you do, sit in a straight-backed chair with your feet flat on the floor  Never reach, pull, or push while you are sitting  · Exercise regularly  Warm up before you exercise  Do exercises that strengthen your back muscles  Ask about the best exercise plan for you  · Maintain a healthy weight  Ask your healthcare provider how much you should weigh   Ask him to help you create a weight loss plan if you are overweight  Follow up with your healthcare provider as directed:  Return for a follow-up visit if you still have pain after 1 to 3 weeks of treatment  You may need to visit an orthopedist if your back pain lasts more than 6 to 12 weeks  Write down your questions so you remember to ask them during your visits  CARE AGREEMENT:   You have the right to help plan your care  Learn about your health condition and how it may be treated  Discuss treatment options with your caregivers to decide what care you want to receive  You always have the right to refuse treatment  The above information is an  only  It is not intended as medical advice for individual conditions or treatments  Talk to your doctor, nurse or pharmacist before following any medical regimen to see if it is safe and effective for you  © 2014 [address] is for End User's use only and may not be sold, redistributed or otherwise used for commercial purposes  All illustrations and images included in CareNotes® are the copyrighted property of A [employer] M [employer]  or Johan Leyva

## 2019-08-21 ENCOUNTER — TELEPHONE (OUTPATIENT)
Dept: PHYSICAL THERAPY | Facility: OTHER | Age: 24
End: 2019-08-21

## 2019-08-21 NOTE — TELEPHONE ENCOUNTER
Nurse informed patient of referral and [facility] offerings  Patient is driving and cannot participate in triage call at this time  Instructed pt to return call and if she will leave a VM w/ name and  one of the nurses will call her asap  Patient agreed and appreciative of call      Referral closed

## 2019-08-27 ENCOUNTER — TELEPHONE (OUTPATIENT)
Dept: INTERNAL MEDICINE CLINIC | Facility: CLINIC | Age: 24
End: 2019-08-27

## 2019-08-27 ENCOUNTER — OFFICE VISIT (OUTPATIENT)
Dept: INTERNAL MEDICINE CLINIC | Facility: CLINIC | Age: 24
End: 2019-08-27

## 2019-08-27 VITALS
DIASTOLIC BLOOD PRESSURE: 80 MMHG | HEIGHT: 64 IN | WEIGHT: 162.3 LBS | SYSTOLIC BLOOD PRESSURE: 114 MMHG | BODY MASS INDEX: 27.71 KG/M2 | HEART RATE: 71 BPM | OXYGEN SATURATION: 98 % | RESPIRATION RATE: 16 BRPM | TEMPERATURE: 99.4 F

## 2019-08-27 DIAGNOSIS — Z00.00 ROUTINE HEALTH MAINTENANCE: ICD-10-CM

## 2019-08-27 NOTE — PATIENT INSTRUCTIONS

## 2019-08-27 NOTE — TELEPHONE ENCOUNTER
I was checking patient in and she was giving me a hard time about being put in as a new patient and stating that she was seen here 3 times this year, with major attitude  I told her that [facility] and that the last time she was seen by Dr Paxton Zamarripa was in 2017 and not seen by Denia Chong yet  When I asked her if she changed the name of PCP on her [employer] card her response was "I don't fucking know"  I then told her that I will check in in the computer (Clau Matias)

## 2019-08-27 NOTE — PROGRESS NOTES
Assessment/Plan: Patient was very disrespectful with staff and continued to be disrespectful with Myself in the room  She was advised when she comes into the office to avoid using profanities  She continued to say I am wasting her time and that to fill the paper work out  I did ask her to leave  She did walk out and in front of the staff did call me a "dumb cunt"  I did proceed to tell her to leave to office and to not come back  She proceeding to curse at me and continued to make comments leaving  I did review previous notes and she was not showing to her appointments  Patient was also told to be here 15 minutes prior to her appointment and states she will come at her time and what can she do run people off the road  I did advise her to call the office if she is running late  Patient will be discharged from practice will make  aware  No problem-specific Assessment & Plan notes found for this encounter  Problem List Items Addressed This Visit        Other    Routine health maintenance    BMI 27 0-27 9,adult - Primary            Subjective:      Patient ID: Suresh Kirkland is a 24 y o  female  Chepe Feliciano is here today to establish care  She was last seen in 2017 with Dr Britney Cuadra  She states she is here today for a form for school and does not know where she is going for school  She states she does not care about her self or anyone else and just wants the paper filled out  She states she tried calling to get in her sooner but we could not get her in any sooner  She states she does not care about how much the lab work does cost she just wants it done  She states she is trying to get out of here and her time is important and she has not time for lab work or anything else  The following portions of the patient's history were reviewed and updated as appropriate:   She  has a past medical history of Anxiety (3/10/2015) and CMV (cytomegalovirus infection) ([facility] )    She   Patient Active Problem List    Diagnosis Date Noted    Routine health maintenance 08/27/2019    BMI 27 0-27 9,adult 08/27/2019    Myalgia 10/13/2016    Anxiety 03/10/2015     She  has a past surgical history that includes Elbow surgery and TONSILECTOMY AND ADNOIDECTOMY  Her family history is not on file  She  reports that she has been smoking cigarettes  She has been smoking about 1 00 pack per day  She quit smokeless tobacco use about 3 years ago  She reports that she drinks alcohol  She reports that she does not use drugs  No current outpatient medications on file  No current facility-administered medications for this visit  Current Outpatient Medications on File Prior to Visit   Medication Sig    [DISCONTINUED] brompheniramine-pseudoephedrine-DM 30-2-10 MG/5ML syrup Take 5 mL by mouth 4 (four) times a day as needed for congestion (Patient not taking: Reported on 8/17/2019)    [DISCONTINUED] fluticasone (FLONASE) 50 mcg/act nasal spray 1 spray into each nostril daily (Patient not taking: Reported on 8/17/2019)    [DISCONTINUED] naproxen (NAPROSYN) 500 mg tablet Take 1 tablet (500 mg total) by mouth 2 (two) times a day with meals for 14 days (Patient not taking: Reported on 8/27/2019)     No current facility-administered medications on file prior to visit  She is allergic to morphine and related       Review of Systems      Objective:      /80 (BP Location: Right arm, Patient Position: Sitting, Cuff Size: Adult)   Pulse 71   Temp 99 4 °F (37 4 °C) (Temporal)   Resp 16   Ht 5' 4" (1 626 m)   Wt 73 6 kg (162 lb 4 8 oz)   SpO2 98%   BMI 27 86 kg/m²          Physical Exam    BMI Counseling: Body mass index is 27 86 kg/m²  Discussed the patient's BMI with her  The BMI is above average  BMI counseling and education was provided to the patient   Nutrition recommendations include reducing portion sizes, decreasing overall calorie intake, 3-5 servings of fruits/vegetables daily, reducing fast food intake, consuming healthier snacks, decreasing soda and/or juice intake, moderation in carbohydrate intake, increasing intake of lean protein, reducing intake of saturated fat and trans fat and reducing intake of cholesterol

## 2019-08-27 NOTE — TELEPHONE ENCOUNTER
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  I then asked where she was going for the classes, her response was "I don't fuckin know, I wasn't paying attention "     I finished taking vitals and information I needed for the chart, and came out and reviewed vitals and information with zac  Once Angel Hyatt was in the room, I did hear her state to the patient that she needs to be respectful to the office staff as well as herself  Inga became loud in response  Angel Hyatt let her know that she shouldn't be coming in using profanity toward the staff  After a few more minutes Angel Hyatt told MARTHA Salinas that the appointment was done  Inga stormed out of the room and was walking away  Angel Hyatt was behind the corner near the room, myself and Madison Mariee were sitting at our desks,  and Inga lightly called Angel Hyatt a "dumb cunt"  Angel Hyatt then asked her what she said and Inga restated "dumb cunt"  Angel Hyatt then told her that she needs to leave the office

## 2019-09-02 PROBLEM — Z76.89 ENCOUNTER TO ESTABLISH CARE: Status: ACTIVE | Noted: 2019-08-27

## 2019-09-05 ENCOUNTER — OFFICE VISIT (OUTPATIENT)
Dept: FAMILY MEDICINE CLINIC | Facility: CLINIC | Age: 24
End: 2019-09-05
Payer: COMMERCIAL

## 2019-09-05 VITALS
HEIGHT: 64 IN | WEIGHT: 165.4 LBS | DIASTOLIC BLOOD PRESSURE: 72 MMHG | RESPIRATION RATE: 16 BRPM | HEART RATE: 76 BPM | TEMPERATURE: 100 F | SYSTOLIC BLOOD PRESSURE: 120 MMHG | BODY MASS INDEX: 28.24 KG/M2

## 2019-09-05 DIAGNOSIS — Z13.1 SCREENING FOR DIABETES MELLITUS: ICD-10-CM

## 2019-09-05 DIAGNOSIS — Z11.1 SCREENING FOR TUBERCULOSIS: ICD-10-CM

## 2019-09-05 DIAGNOSIS — E55.9 VITAMIN D DEFICIENCY: ICD-10-CM

## 2019-09-05 DIAGNOSIS — Z13.29 SCREENING FOR THYROID DISORDER: ICD-10-CM

## 2019-09-05 DIAGNOSIS — Z02.0 SCHOOL PHYSICAL EXAM: ICD-10-CM

## 2019-09-05 DIAGNOSIS — Z13.0 SCREENING FOR DEFICIENCY ANEMIA: ICD-10-CM

## 2019-09-05 DIAGNOSIS — Z13.220 SCREENING FOR HYPERLIPIDEMIA: ICD-10-CM

## 2019-09-05 DIAGNOSIS — Z76.89 ENCOUNTER TO ESTABLISH CARE: Primary | ICD-10-CM

## 2019-09-05 PROCEDURE — 99213 OFFICE O/P EST LOW 20 MIN: CPT | Performed by: NURSE PRACTITIONER

## 2019-09-05 PROCEDURE — 3008F BODY MASS INDEX DOCD: CPT | Performed by: NURSE PRACTITIONER

## 2019-09-05 NOTE — PROGRESS NOTES
Assessment/Plan:    Problem List Items Addressed This Visit        Other    School physical exam - Primary    Screening for deficiency anemia    Relevant Orders    CBC and differential    Vitamin D deficiency    Relevant Orders    Vitamin D 25 hydroxy           Diagnoses and all orders for this visit:    Encounter to establish care    Screening for tuberculosis    Screening for deficiency anemia  -     CBC and differential; Future    Screening for diabetes mellitus  -     Comprehensive metabolic panel; Future    Screening for hyperlipidemia  -     Lipid panel; Future    Vitamin D deficiency  -     Vitamin D 25 hydroxy; Future    Screening for thyroid disorder  -     TSH, 3rd generation with Free T4 reflex; Future    School physical exam    Other orders  -     Cancel: Quantiferon TB Gold Plus; Future        No problem-specific Assessment & Plan notes found for this encounter  Subjective:      Patient ID: Jacquelin Zamora is a 24 y o  female  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  Pt is pleasant and offers no acute complaints today  The following portions of the patient's history were reviewed and updated as appropriate:   She has a past medical history of Anxiety (3/10/2015) and CMV (cytomegalovirus infection) ([facility] [address] )  ,  does not have any pertinent problems on file  ,   has a past surgical history that includes Elbow surgery (Right) and TONSILECTOMY AND ADNOIDECTOMY  ,  family history is not on file  ,   reports that she has been smoking cigarettes  She has a 9 00 pack-year smoking history  She has never used smokeless tobacco  She reports that she drinks alcohol  She reports that she does not use drugs  ,  is allergic to morphine and related     No current outpatient medications on file  No current facility-administered medications for this visit  Review of Systems   Constitutional: Negative  HENT: Negative  Eyes: Negative  Respiratory: Negative      Cardiovascular: Negative  Gastrointestinal: Negative  Endocrine: Negative  Genitourinary: Negative  Musculoskeletal: Negative  Skin: Negative  Allergic/Immunologic: Negative  Neurological: Negative  Hematological: Negative  Psychiatric/Behavioral: Negative  Objective:  Vitals:    09/05/19 1558   BP: 120/72   BP Location: Right arm   Patient Position: Sitting   Cuff Size: Standard   Pulse: 76   Resp: 16   Temp: 100 °F (37 8 °C)   TempSrc: Tympanic   Weight: 75 kg (165 lb 6 4 oz)   Height: 5' 4" (1 626 m)     Body mass index is 28 39 kg/m²  Physical Exam   Constitutional: She is oriented to person, place, and time  She appears well-developed and well-nourished  She is cooperative  HENT:   Head: Normocephalic and atraumatic  Right Ear: Tympanic membrane, external ear and ear canal normal    Left Ear: Tympanic membrane, external ear and ear canal normal    Nose: Nose normal    Mouth/Throat: Uvula is midline, oropharynx is clear and moist and mucous membranes are normal    Eyes: Pupils are equal, round, and reactive to light  Conjunctivae, EOM and lids are normal    Neck: Trachea normal, normal range of motion, full passive range of motion without pain and phonation normal  Neck supple  No JVD present  No thyroid mass and no thyromegaly present  Cardiovascular: Normal rate, regular rhythm, S1 normal, S2 normal, normal heart sounds, intact distal pulses and normal pulses  Exam reveals no gallop and no friction rub  No murmur heard  Pulmonary/Chest: Effort normal and breath sounds normal  She has no decreased breath sounds  Abdominal: Soft  Normal appearance and bowel sounds are normal  There is no hepatosplenomegaly  There is no tenderness  No hernia  Genitourinary:   Genitourinary Comments: Deferred    Musculoskeletal: Normal range of motion  Neurological: She is alert and oriented to person, place, and time  She has normal reflexes  No cranial nerve deficit     Skin: Skin is warm, dry and intact  Capillary refill takes less than 2 seconds  Psychiatric: She has a normal mood and affect  Her speech is normal and behavior is normal  Judgment and thought content normal  Cognition and memory are normal    Nursing note and vitals reviewed

## 2019-09-05 NOTE — PATIENT INSTRUCTIONS
Exercise Safety   AMBULATORY CARE:   Exercise safety  includes using correct equipment and positions to work the muscles without causing more injury  You will need to know which exercises to do and how often to do them  You will also need to know what to do if you feel pain or think an exercise caused injury  Do not start an exercise program before you talk to your healthcare provider  You may need to wait until your swelling and pain have gone down before you start to exercise  Contact your healthcare provider if:   · You have sharp pain during exercise or at rest     · You have questions or concerns about your stretches or exercises  What you need to know before you exercise:   · Exercises help lower pain and swelling after an injury or surgery  They also help strengthen the muscles that support your joints and bones  This may help prevent another injury  · You may need a light weight or an exercise band for some exercises  Your healthcare provider will tell you how much weight to exercise with  Ask your healthcare provider where to buy a weight or an exercise band  · Your healthcare provider will tell you how often to do each exercise  The provider will also tell you how many times to repeat each exercise and how many sets you should do  You may be told to do different exercises on different days  · Warm up and stretch before you exercise  Walk or ride a stationary bike for 5 to 10 minutes to help you warm up  Stretching helps increase range of motion  It may also relieve muscle soreness and help prevent another injury  Your healthcare provider will show you which stretches you need to do  What you can do to exercise safely:   · Move slowly and smoothly  Avoid fast or jerky motions  This will help prevent another injury  · Breathe normally  Do not hold your breath  It is important to breathe in and out so you do not tense up during exercise   Tension could prevent you from moving your joint in a full range of motion  · Stop if you feel sharp pain or an increase in pain  Stop the exercise and contact your healthcare provider if you have these symptoms  It is normal to feel some discomfort, such as a dull ache, during exercise  Regular exercise will help decrease your discomfort over time  Follow up with your physical therapist as directed:  Write down your questions so you remember to ask them during your visits  © 2017 [address] [employer] Information is for End User's use only and may not be sold, redistributed or otherwise used for commercial purposes  All illustrations and images included in CareNotes® are the copyrighted property of A D A M [employer]  or Johan Leyva  The above information is an  only  It is not intended as medical advice for individual conditions or treatments  Talk to your doctor, nurse or pharmacist before following any medical regimen to see if it is safe and effective for you  Low Fat Diet   AMBULATORY CARE:   A low-fat diet  is an eating plan that is low in total fat, unhealthy fat, and cholesterol  You may need to follow a low-fat diet if you have trouble digesting or absorbing fat  You may also need to follow this diet if you have high cholesterol  You can also lower your cholesterol by increasing the amount of fiber in your diet  Soluble fiber is a type of fiber that helps to decrease cholesterol levels  Different types of fat in food:   · Limit unhealthy fats  A diet that is high in cholesterol, saturated fat, and trans fat may cause unhealthy cholesterol levels  Unhealthy cholesterol levels increase your risk of heart disease  ¨ Cholesterol:  Limit intake of cholesterol to less than 200 mg per day  Cholesterol is found in meat, eggs, and dairy  ¨ Saturated fat:  Limit saturated fat to less than 7% of your total daily calories  Ask your dietitian how many calories you need each day   Saturated fat is found in butter, cheese, ice cream, whole milk, and palm oil  Saturated fat is also found in meat, such as beef, pork, chicken skin, and processed meats  Processed meats include sausage, hot dogs, and bologna  ¨ Trans fat:  Avoid trans fat as much as possible  Trans fat is used in fried and baked foods  Foods that say trans fat free on the label may still have up to 0 5 grams of trans fat per serving  · Include healthy fats  Replace foods that are high in saturated and trans fat with foods high in healthy fats  This may help to decrease high cholesterol levels  ¨ Monounsaturated fats: These are found in avocados, nuts, and vegetable oils, such as olive, canola, and sunflower oil  ¨ Polyunsaturated fats: These can be found in vegetable oils, such as soybean or corn oil  Omega-3 fats can help to decrease the risk of heart disease  Omega-3 fats are found in fish, such as salmon, herring, trout, and tuna  Omega-3 fats can also be found in plant foods, such as walnuts, flaxseed, soybeans, and canola oil    Foods to limit or avoid:   · Grains:      ¨ Snacks that are made with partially hydrogenated oils, such as chips, regular crackers, and butter-flavored popcorn    ¨ High-fat baked goods, such as biscuits, croissants, doughnuts, pies, cookies, and pastries    · Dairy:      ¨ Whole milk, 2% milk, and yogurt and ice cream made with whole milk    ¨ Half and half creamer, heavy cream, and whipping cream    ¨ Cheese, cream cheese, and sour cream    · Meats and proteins:      ¨ High-fat cuts of meat (T-bone steak, regular hamburger, and ribs)    ¨ Fried meat, poultry (turkey and chicken), and fish    ¨ Poultry (chicken and turkey) with skin    ¨ Cold cuts (salami or bologna), hot dogs, hensley, and sausage    ¨ Whole eggs and egg yolks    · Vegetables and fruits with added fat:      ¨ Fried vegetables or vegetables in butter or high-fat sauces, such as cream or cheese sauces    ¨ Fried fruit or fruit served with butter or cream    · Fats:      ¨ Butter, stick margarine, and shortening    ¨ Coconut, palm oil, and palm kernel oil  Foods to include:   · Grains:      ¨ Whole-grain breads, cereals, pasta, and brown rice    ¨ Low-fat crackers and pretzels    · Vegetables and fruits:      ¨ Fresh, frozen, or canned vegetables (no salt or low-sodium)    ¨ Fresh, frozen, dried, or canned fruit (canned in light syrup or fruit juice)    ¨ Avocado    · Low-fat dairy products:      ¨ Nonfat (skim) or 1% milk    ¨ Nonfat or low-fat cheese, yogurt, and cottage cheese    · Meats and proteins:      ¨ Chicken or turkey with no skin    ¨ Baked or broiled fish    ¨ Lean beef and pork (loin, round, extra lean hamburger)    ¨ Beans and peas, unsalted nuts, soy products    ¨ Egg whites and substitutes    ¨ Seeds and nuts    · Fats:      ¨ Unsaturated oil, such as canola, olive, peanut, soybean, or sunflower oil    ¨ Soft or liquid margarine and vegetable oil spread    ¨ Low-fat salad dressing  Other ways to decrease fat:   · Read food labels before you buy foods  Choose foods that have less than 30% of calories from fat  Choose low-fat or fat-free dairy products  Remember that fat free does not mean calorie free  These foods still contain calories, and too many calories can lead to weight gain  · Trim fat from meat and avoid fried food  Trim all visible fat from meat before you cook it  Remove the skin from poultry  Do not prater meat, fish, or poultry  Bake, roast, boil, or broil these foods instead  Avoid fried foods  Eat a baked potato instead of [place] Pat fries  Steam vegetables instead of sautéing them in butter  · Add less fat to foods  Use imitation hensley bits on salads and baked potatoes instead of regular hensley bits  Use fat-free or low-fat salad dressings instead of regular dressings  Use low-fat or nonfat butter-flavored topping instead of regular butter or margarine on popcorn and other foods    Ways to decrease fat in recipes:  Replace high-fat ingredients with low-fat or nonfat ones  This may cause baked goods to be drier than usual  You may need to use nonfat cooking spray on pans to prevent food from sticking  You also may need to change the amount of other ingredients, such as water, in the recipe  Try the following:  · Use low-fat or light margarine instead of regular margarine or shortening  · Use lean ground turkey breast or chicken, or lean ground beef (less than 5% fat) instead of hamburger  · Add 1 teaspoon of canola oil to 8 ounces of skim milk instead of using cream or half and half  · Use grated zucchini, carrots, or apples in breads instead of coconut  · Use blenderized, low-fat cottage cheese, plain tofu, or low-fat ricotta cheese instead of cream cheese  · Use 1 egg white and 1 teaspoon of canola oil, or use ¼ cup (2 ounces) of fat-free egg substitute instead of a whole egg  · Replace half of the oil that is called for in a recipe with applesauce when you bake  Use 3 tablespoons of cocoa powder and 1 tablespoon of canola oil instead of a square of baking chocolate  How to increase fiber:  Eat enough high-fiber foods to get 20 to 30 grams of fiber every day  Slowly increase your fiber intake to avoid stomach cramps, gas, and other problems  · Eat 3 ounces of whole-grain foods each day  An ounce is about 1 slice of bread  Eat whole-grain breads, such as whole-wheat bread  Whole wheat, whole-wheat flour, or other whole grains should be listed as the first ingredient on the food label  Replace white flour with whole-grain flour or use half of each in recipes  Whole-grain flour is heavier than white flour, so you may have to add more yeast or baking powder  · Eat a high-fiber cereal for breakfast   Oatmeal is a good source of soluble fiber  Look for cereals that have bran or fiber in the name  Choose whole-grain products, such as brown rice, barley, and whole-wheat pasta       · Eat more beans, peas, and lentils  For example, add beans to soups or salads  Eat at least 5 cups of fruits and vegetables each day  Eat fruits and vegetables with the peel because the peel is high in fiber  © 2017 [address] [employer] Information is for End User's use only and may not be sold, redistributed or otherwise used for commercial purposes  All illustrations and images included in CareNotes® are the copyrighted property of A D A M [employer] Inc  or Johan Leyva  The above information is an  only  It is not intended as medical advice for individual conditions or treatments  Talk to your doctor, nurse or pharmacist before following any medical regimen to see if it is safe and effective for you  Breast Self Exam for Women   AMBULATORY CARE:   A breast self-exam (BSE)  is a way to check your breasts for lumps and other changes  Regular BSEs can help you know how your breasts normally look and feel  Most breast lumps or changes are not cancer, but you should always have them checked by a healthcare provider  Why you should do a BSE:  Breast cancer is the most common type of cancer in women  Even if you have mammograms, you may still want to do a BSE regularly  If you know how your breasts normally feel and look, it may help you know when to contact your healthcare provider  Mammograms can miss some cancers  You may find a lump during a BSE that did not show up on your mammogram   When you should do a BSE:  Samantha Chambers your calendar to help you remember to do BSE on a regular schedule  One easy way to remember to do a BSE is to do the exam on the same day of each month  If you have periods, you may want to do your BSE 1 week after your period ends  This is the time when your breasts may be the least swollen, lumpy, or tender  You can do regular BSEs even if you are breastfeeding or have breast implants  Contact your healthcare provider if:   · You find any lumps or changes in your breasts      · You have breast pain or fluid coming from your nipples  · You have questions or concerns about your condition or care  How to do a BSE:   · Look at your breasts in a mirror  Look at the size and shape of each breast and nipple  Check for swelling, lumps, dimpling, scaly skin, or other skin changes  Look for nipple changes, such as a nipple that is painful or beginning to pull inward  Gently squeeze both nipples and check to see if fluid (that is not breast milk) comes out of them  If you find any of these or other breast changes, contact your healthcare provider  Check your breasts while you sit or  the following 3 positions:    [facility] your arms down at your sides  ¨ Raise your hands and join them behind your head  ¨ Put firm pressure with your hands on your hips  Bend slightly forward while you look at your breasts in the mirror  · Lie down and feel your breasts  When you lie down, your breast tissue spreads out evenly over your chest  This makes it easier for you to feel for lumps and anything that may not be normal for your breasts  Do a BSE on one breast at a time  ¨ Place a small pillow or towel under your left shoulder  Put your left arm behind your head  ¨ Use the 3 middle fingers of your right hand  Use your fingertip pads, on the top of your fingers  Your fingertip pad is the most sensitive part of your finger  ¨ Use small circles to feel your breast tissue  Use your fingertip pads to make dime-sized, overlapping circles on your breast and armpits  Use light, medium, and firm pressure  First, press lightly  Second, press with medium pressure to feel a little deeper into the breast  Last, use firm pressure to feel deep within your breast     ¨ Examine your entire breast area  Examine the breast area from above the breast to below the breast where you feel only ribs  Make small circles with your fingertips, starting in the middle of your armpit   Make circles going up and down the breast area  Continue toward your breast and all the way across it  Examine the area from your armpit all the way over to the middle of your chest (breastbone)  Stop at the middle of your chest     ¨ Move the pillow or towel to your right shoulder, and put your right arm behind your head  Use the 3 fingertip pads of your left hand, and repeat the above steps to do a BSE on your right breast        What else you can do to check for breast problems or cancer:  Some experts suggest that women 36years of age or older should have a mammogram every year  Other experts suggest that women between the ages of 48and 76years old should have a mammogram every 2 years  Talk to your healthcare provider about when you should have a mammogram   Follow up with your healthcare provider as directed: Your healthcare provider can watch you and tell you if you are doing your BSE correctly  Write down your questions so you remember to ask them during your visits  © 2017 [address] [facility] Information is for End User's use only and may not be sold, redistributed or otherwise used for commercial purposes  All illustrations and images included in CareNotes® are the copyrighted property of [employer]  or Johan Levya  The above information is an  only  It is not intended as medical advice for individual conditions or treatments  Talk to your doctor, nurse or pharmacist before following any medical regimen to see if it is safe and effective for you

## 2019-10-26 ENCOUNTER — OFFICE VISIT (OUTPATIENT)
Dept: URGENT CARE | Facility: CLINIC | Age: 24
End: 2019-10-26
Payer: COMMERCIAL

## 2019-10-26 VITALS
HEART RATE: 80 BPM | TEMPERATURE: 98.2 F | RESPIRATION RATE: 18 BRPM | DIASTOLIC BLOOD PRESSURE: 70 MMHG | OXYGEN SATURATION: 99 % | HEIGHT: 64 IN | BODY MASS INDEX: 28.17 KG/M2 | WEIGHT: 165 LBS | SYSTOLIC BLOOD PRESSURE: 104 MMHG

## 2019-10-26 DIAGNOSIS — A08.4 VIRAL GASTROENTERITIS: Primary | ICD-10-CM

## 2019-10-26 PROCEDURE — G0382 LEV 3 HOSP TYPE B ED VISIT: HCPCS | Performed by: NURSE PRACTITIONER

## 2019-10-26 PROCEDURE — 99213 OFFICE O/P EST LOW 20 MIN: CPT | Performed by: NURSE PRACTITIONER

## 2019-10-26 PROCEDURE — 99283 EMERGENCY DEPT VISIT LOW MDM: CPT | Performed by: NURSE PRACTITIONER

## 2019-10-26 NOTE — PATIENT INSTRUCTIONS
Continue pushing oral fluids  Do not take medication to stop the diarrhea--that is how your body flushes the infection out  As you can tolerate, eat yogurt or take a probiotic to restore good bacteria to your gut  Gastroenteritis   AMBULATORY CARE:   Gastroenteritis , or stomach flu, is an infection of the stomach and intestines  It is caused by bacteria, parasites, or viruses  Common symptoms include the following:   · Diarrhea or gas    · Nausea, vomiting, or poor appetite    · Abdominal cramps, pain, or gurgling    · Fever    · Tiredness or weakness    · Headaches or muscle aches with any of the above symptoms  Call 911 for any of the following:   · You have trouble breathing or a very fast pulse  Seek care immediately if:   · You see blood in your diarrhea  · You cannot stop vomiting  · You have not urinated for 12 hours  · You feel like you are going to faint  Contact your healthcare provider if:   · You have a fever  · You continue to vomit or have diarrhea, even after treatment  · You see worms in your diarrhea  · Your mouth or eyes are dry  You are not urinating as much or as often  · You have questions or concerns about your condition or care  Treatment for gastroenteritis  may include medicines to slow or stop your diarrhea or vomiting  You may also need medicines to treat an infection caused by bacteria or a parasite  Manage your symptoms:   · Drink liquids as directed  Ask your healthcare provider how much liquid to drink each day, and which liquids are best for you  You may also need to drink an oral rehydration solution (ORS)  An ORS has the right amounts of sugar, salt, and minerals in water to replace body fluids  · Eat bland foods  When you feel hungry, begin eating soft, bland foods  Examples are bananas, clear soup, potatoes, and applesauce  Do not have dairy products, alcohol, sugary drinks, or drinks with caffeine until you feel better      · Rest as much as possible  Slowly start to do more each day when you begin to feel better  Prevent the spread of germs:  Gastroenteritis can spread easily  Keep yourself, your family, and your surroundings clean to help prevent the spread of gastroenteritis:  · Wash your hands often  Use soap and water  Wash your hands after you use the bathroom, change a child's diapers, or sneeze  Wash your hands before you prepare or eat food  · Clean surfaces and do laundry often  Wash your clothes and towels separately from the rest of the laundry  Clean surfaces in your home with antibacterial  or bleach  · Clean food thoroughly and cook safely  Wash raw vegetables before you cook  Cook meat, fish, and eggs fully  Do not use the same dishes for raw meat as you do for other foods  Refrigerate any leftover food immediately  · Be aware when you camp or travel  Drink only clean water  Do not drink from rivers or lakes unless you purify or boil the water first  When you travel, drink bottled water and do not add ice  Do not eat fruit that has not been peeled  Do not eat raw fish or meat that is not fully cooked  Follow up with your healthcare provider as directed:  Write down your questions so you remember to ask them during your visits  © 2017 [address] [employer] Information is for End User's use only and may not be sold, redistributed or otherwise used for commercial purposes  All illustrations and images included in CareNotes® are the copyrighted property of [employer]  or Johan Leyva  The above information is an  only  It is not intended as medical advice for individual conditions or treatments  Talk to your doctor, nurse or pharmacist before following any medical regimen to see if it is safe and effective for you

## 2019-10-26 NOTE — PROGRESS NOTES
[facility]        NAME: Eli Spaulding is a 24 y o  female  : 1995    MRN: [id number]  DATE: 2019  TIME: 5:04 PM    Assessment and Plan   Viral gastroenteritis [A08 4]  1  Viral gastroenteritis           Patient Instructions     Patient Instructions     Continue pushing oral fluids  Do not take medication to stop the diarrhea--that is how your body flushes the infection out  As you can tolerate, eat yogurt or take a probiotic to restore good bacteria to your gut  Gastroenteritis   AMBULATORY CARE:   Gastroenteritis , or stomach flu, is an infection of the stomach and intestines  It is caused by bacteria, parasites, or viruses  Common symptoms include the following:   · Diarrhea or gas    · Nausea, vomiting, or poor appetite    · Abdominal cramps, pain, or gurgling    · Fever    · Tiredness or weakness    · Headaches or muscle aches with any of the above symptoms  Call 911 for any of the following:   · You have trouble breathing or a very fast pulse  Seek care immediately if:   · You see blood in your diarrhea  · You cannot stop vomiting  · You have not urinated for 12 hours  · You feel like you are going to faint  Contact your healthcare provider if:   · You have a fever  · You continue to vomit or have diarrhea, even after treatment  · You see worms in your diarrhea  · Your mouth or eyes are dry  You are not urinating as much or as often  · You have questions or concerns about your condition or care  Treatment for gastroenteritis  may include medicines to slow or stop your diarrhea or vomiting  You may also need medicines to treat an infection caused by bacteria or a parasite  Manage your symptoms:   · Drink liquids as directed  Ask your healthcare provider how much liquid to drink each day, and which liquids are best for you  You may also need to drink an oral rehydration solution (ORS)   An ORS has the right amounts of sugar, salt, and minerals in water to replace body fluids  · Eat bland foods  When you feel hungry, begin eating soft, bland foods  Examples are bananas, clear soup, potatoes, and applesauce  Do not have dairy products, alcohol, sugary drinks, or drinks with caffeine until you feel better  · Rest as much as possible  Slowly start to do more each day when you begin to feel better  Prevent the spread of germs:  Gastroenteritis can spread easily  Keep yourself, your family, and your surroundings clean to help prevent the spread of gastroenteritis:  · Wash your hands often  Use soap and water  Wash your hands after you use the bathroom, change a child's diapers, or sneeze  Wash your hands before you prepare or eat food  · Clean surfaces and do laundry often  Wash your clothes and towels separately from the rest of the laundry  Clean surfaces in your home with antibacterial  or bleach  · Clean food thoroughly and cook safely  Wash raw vegetables before you cook  Cook meat, fish, and eggs fully  Do not use the same dishes for raw meat as you do for other foods  Refrigerate any leftover food immediately  · Be aware when you camp or travel  Drink only clean water  Do not drink from rivers or lakes unless you purify or boil the water first  When you travel, drink bottled water and do not add ice  Do not eat fruit that has not been peeled  Do not eat raw fish or meat that is not fully cooked  Follow up with your healthcare provider as directed:  Write down your questions so you remember to ask them during your visits  © 2017 [employer]  Information is for End User's use only and may not be sold, redistributed or otherwise used for commercial purposes  All illustrations and images included in CareNotes® are the copyrighted property of [employer] , [employer]  or Johan Leyva  The above information is an  only   It is not intended as medical advice for individual conditions or treatments  Talk to your doctor, nurse or pharmacist before following any medical regimen to see if it is safe and effective for you  Follow up with PCP in 3-5 days  Proceed to  ER if symptoms worsen  Chief Complaint     Chief Complaint   Patient presents with    Vomiting     vomiting and diarrhea since 10 pm last night         History of Present Illness       Patient began experiencing nausea, vomiting, diarrhea last night about 10:00 p m  Fermin Cheney She states that she has not vomited since early this morning, but has had several episodes of diarrhea throughout the day  She denies any current nausea and notes that the Zofran did not help her nausea during prior pregnancy anyway  She states she thinks she has a stomach bug  She stayed home from work today and requests work note  Review of Systems   Review of Systems   Gastrointestinal: Positive for abdominal pain, diarrhea, nausea and vomiting  All other systems reviewed and are negative  Current Medications     No current outpatient medications on file  Current Allergies     Allergies as of 10/26/2019 - Reviewed 10/26/2019   Allergen Reaction Noted    Morphine and related Hives 10/09/2016            The following portions of the patient's history were reviewed and updated as appropriate: allergies, current medications, past family history, past medical history, past social history, past surgical history and problem list      Past Medical History:   Diagnosis Date    Anxiety 3/10/2015    CMV (cytomegalovirus infection) ([facility] [address] )     age 7       Past Surgical History:   Procedure Laterality Date    ELBOW SURGERY Right     TONSILECTOMY AND ADNOIDECTOMY         Family History   Problem Relation Age of Onset    Cancer Neg Hx          Medications have been verified          Objective   /70   Pulse 80   Temp 98 2 °F (36 8 °C) (Tympanic)   Resp 18   Ht 5' 4" (1 626 m)   Wt 74 8 kg (165 lb)   SpO2 99%   BMI 28 32 kg/m²        Physical Exam     Physical Exam   Constitutional: She is oriented to person, place, and time  She appears well-developed and well-nourished  No distress  HENT:   Head: Normocephalic and atraumatic  Eyes: Pupils are equal, round, and reactive to light  Neck: Normal range of motion  Neck supple  Cardiovascular: Normal rate, regular rhythm and normal heart sounds  Pulmonary/Chest: Effort normal and breath sounds normal  No stridor  No respiratory distress  She has no wheezes  She has no rales  Abdominal: Soft  She exhibits no distension  Bowel sounds are increased  There is generalized tenderness  There is no rigidity, no rebound, no guarding, no tenderness at McBurney's point and negative Sanchez's sign  Musculoskeletal: Normal range of motion  Neurological: She is alert and oriented to person, place, and time  Skin: Skin is warm and dry  Capillary refill takes less than 2 seconds  She is not diaphoretic  Psychiatric: She has a normal mood and affect  Her behavior is normal  Judgment and thought content normal    Nursing note and vitals reviewed

## 2019-10-26 NOTE — LETTER
October 26, 2019     Patient: Dilcia Bates   YOB: 1995   Date of Visit: 10/26/2019       To Whom It May Concern: It is my medical opinion that Dilcia Bates may return to work on 10/27 or 10/28, depending on symptoms  Please excuse from work 10/26 and possibly 10/27  If you have any questions or concerns, please don't hesitate to call           Sincerely,        CRISTIANO hWeat    CC: No Recipients

## 2019-12-23 ENCOUNTER — HOSPITAL ENCOUNTER (OUTPATIENT)
Dept: GASTROENTEROLOGY | Facility: HOSPITAL | Age: 24
Setting detail: OUTPATIENT SURGERY
Discharge: HOME | End: 2019-12-23
Attending: COLON & RECTAL SURGERY

## 2020-02-22 ENCOUNTER — HOSPITAL ENCOUNTER (EMERGENCY)
Facility: HOSPITAL | Age: 25
Discharge: HOME | End: 2020-02-22
Attending: EMERGENCY MEDICINE | Admitting: EMERGENCY MEDICINE

## 2020-02-22 VITALS
BODY MASS INDEX: 28.17 KG/M2 | OXYGEN SATURATION: 98 % | SYSTOLIC BLOOD PRESSURE: 135 MMHG | HEIGHT: 64 IN | HEART RATE: 114 BPM | DIASTOLIC BLOOD PRESSURE: 65 MMHG | WEIGHT: 165 LBS | TEMPERATURE: 102.8 F | RESPIRATION RATE: 18 BRPM

## 2020-02-22 DIAGNOSIS — J10.1 INFLUENZA A: Primary | ICD-10-CM

## 2020-02-22 LAB
FLUAV RNA NPH QL NAA+PROBE: DETECTED
FLUBV RNA NPH QL NAA+PROBE: ABNORMAL
RSV RNA NPH QL NAA+PROBE: ABNORMAL
S PYO DNA THROAT QL NAA+PROBE: NORMAL

## 2020-02-22 PROCEDURE — 87651 STREP A DNA AMP PROBE: CPT | Performed by: EMERGENCY MEDICINE

## 2020-02-22 PROCEDURE — 87631 RESP VIRUS 3-5 TARGETS: CPT | Performed by: EMERGENCY MEDICINE

## 2020-02-22 PROCEDURE — 99283 EMERGENCY DEPT VISIT LOW MDM: CPT | Performed by: EMERGENCY MEDICINE

## 2020-02-22 PROCEDURE — 99284 EMERGENCY DEPT VISIT MOD MDM: CPT

## 2020-02-22 RX ORDER — OSELTAMIVIR PHOSPHATE 75 MG/1
75 CAPSULE ORAL EVERY 12 HOURS
Qty: 10 CAPSULE | Refills: 0 | Status: SHIPPED | OUTPATIENT
Start: 2020-02-22 | End: 2020-02-27

## 2020-02-22 RX ORDER — IBUPROFEN 600 MG/1
600 TABLET ORAL ONCE
Status: COMPLETED | OUTPATIENT
Start: 2020-02-22 | End: 2020-02-22

## 2020-02-22 RX ADMIN — IBUPROFEN 600 MG: 600 TABLET, FILM COATED ORAL at 14:43

## 2020-02-22 NOTE — ED PROVIDER NOTES
History  Chief Complaint   Patient presents with    Dizziness     room spinning started this AM, when up and walking   Nasal Congestion     started today, clear drainage     Chest Pain     when taking deep breath, yesterday morning, constant, no aggrevating or alleviating symptoms  Took mucinex last night which helped in the past did not help this time     Cough     congested, grey green, started last night      Since this morning malaise myalgias headache photophobia neck stiffness sore throat cough sweats and chills  No overseas travel no sick contact  Patient is a smoker has no other medical history except for prior drug addiction and anxiety  Denies recent IV drug abuse          None       Past Medical History:   Diagnosis Date    Anxiety 3/10/2015    CMV (cytomegalovirus infection) ([facility] [address] )     age 7   [place] Colon polyp     Drug addiction ([facility] [address] )     Family hx of colon cancer     mother       Past Surgical History:   Procedure Laterality Date    COLONOSCOPY      ELBOW SURGERY Right     TONSILECTOMY AND ADNOIDECTOMY      TONSILLECTOMY      TRICEPS TENDON RELEASE Right        Family History   Problem Relation Age of Onset    Cancer Neg Hx      I have reviewed and agree with the history as documented  Social History     Tobacco Use    Smoking status: Current Every Day Smoker     Packs/day: 1 00     Years: 9 00     Pack years: 9 00     Types: Cigarettes    Smokeless tobacco: Never Used    Tobacco comment: Family members smoke outdoors   Substance Use Topics    Alcohol use: Yes     Frequency: Monthly or less    Drug use: No       Review of Systems   Constitutional: Positive for fever  HENT: Positive for rhinorrhea  Eyes: Negative for visual disturbance  Respiratory: Positive for cough  Negative for shortness of breath  Cardiovascular: Negative for chest pain  Gastrointestinal: Negative for abdominal pain, diarrhea and vomiting  Endocrine: Negative for polydipsia     Genitourinary: Negative for dysuria, frequency and hematuria  Musculoskeletal: Negative for neck stiffness  Skin: Negative for rash  Allergic/Immunologic: Negative for immunocompromised state  Neurological: Positive for headaches  Negative for speech difficulty, weakness and numbness  Physical Exam  Physical Exam   Constitutional: She is oriented to person, place, and time  She appears well-nourished  No distress  HENT:   Head: Normocephalic and atraumatic  Eyes: Conjunctivae and EOM are normal    No photophobia   Neck: Normal range of motion  Neck supple  No mengism   Cardiovascular: Regular rhythm and normal heart sounds  Pulmonary/Chest: Effort normal and breath sounds normal    Abdominal: Soft  Bowel sounds are normal  She exhibits no mass  There is no tenderness  There is no guarding  Musculoskeletal: She exhibits no edema  Neurological: She is alert and oriented to person, place, and time  Skin: Skin is warm and dry  No rash noted  Psychiatric: She has a normal mood and affect         Vital Signs  ED Triage Vitals   Temperature Pulse Respirations Blood Pressure SpO2   02/22/20 1425 02/22/20 1422 02/22/20 1422 02/22/20 1422 02/22/20 1422   (!) 102 3 °F (39 1 °C) (!) 114 18 135/65 98 %      Temp Source Heart Rate Source Patient Position - Orthostatic VS BP Location FiO2 (%)   02/22/20 1422 02/22/20 1422 02/22/20 1422 -- --   Temporal Monitor Sitting        Pain Score       02/22/20 1422       8           Vitals:    02/22/20 1422   BP: 135/65   Pulse: (!) 114   Patient Position - Orthostatic VS: Sitting         Visual Acuity      ED Medications  Medications   ibuprofen (MOTRIN) tablet 600 mg (600 mg Oral Given 2/22/20 1443)       Diagnostic Studies  Results Reviewed     Procedure Component Value Units Date/Time    Influenza A/B and RSV PCR [[id number]]  (Abnormal) Collected:  02/22/20 1441    Lab Status:  Final result Specimen:  Nose Updated:  02/22/20 1538     INFLUENZA A PCR Detected     INFLUENZA B PCR None Detected     RSV PCR None Detected    Strep A PCR [[id number]]  (Normal) Collected:  02/22/20 1441    Lab Status:  Final result Specimen:  Throat Updated:  02/22/20 1528     STREP A PCR None Detected                 No orders to display              Procedures  Procedures         ED Course                               MDM  Number of Diagnoses or Management Options  Influenza A:         Disposition  Final diagnoses:   Influenza A     Time reflects when diagnosis was documented in both MDM as applicable and the Disposition within this note     Time User Action Codes Description Comment    2/22/2020  3:40 PM Silvino Bustamante Add [J10 1] Influenza A       ED Disposition     ED Disposition Condition Date/Time Comment    Discharge Stable Sat Feb 22, 2020  3:39 PM Corby Vaca discharge to home/self care  Follow-up Information    None         Patient's Medications   Discharge Prescriptions    OSELTAMIVIR (TAMIFLU) 75 MG CAPSULE    Take 1 capsule (75 mg total) by mouth every 12 (twelve) hours for 5 days       Start Date: 2/22/2020 End Date: 2/27/2020       Order Dose: 75 mg       Quantity: 10 capsule    Refills: 0     No discharge procedures on file      PDMP Review     None          ED Provider  Electronically Signed by           Ann Henderson MD  02/22/20 6994

## 2020-05-02 ENCOUNTER — HOSPITAL ENCOUNTER (EMERGENCY)
Facility: HOSPITAL | Age: 25
Discharge: HOME | End: 2020-05-02
Attending: EMERGENCY MEDICINE | Admitting: EMERGENCY MEDICINE
Payer: COMMERCIAL

## 2020-05-02 VITALS
TEMPERATURE: 98.7 F | OXYGEN SATURATION: 98 % | RESPIRATION RATE: 18 BRPM | SYSTOLIC BLOOD PRESSURE: 127 MMHG | BODY MASS INDEX: 29.96 KG/M2 | HEART RATE: 105 BPM | DIASTOLIC BLOOD PRESSURE: 75 MMHG | HEIGHT: 64 IN | WEIGHT: 175.49 LBS

## 2020-05-02 DIAGNOSIS — S60.569A: Primary | ICD-10-CM
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
## 2020-05-02 PROCEDURE — 99283 EMERGENCY DEPT VISIT LOW MDM: CPT

## 2020-05-02 PROCEDURE — 99284 EMERGENCY DEPT VISIT MOD MDM: CPT | Performed by: EMERGENCY MEDICINE

## 2020-05-02 RX ORDER — PREDNISONE 10 MG/1
TABLET ORAL
Qty: 24 TABLET | Refills: 0 | Status: SHIPPED | OUTPATIENT
Start: 2020-05-02 | End: 2020-07-22 | Stop reason: ALTCHOICE

## 2020-05-02 RX ADMIN — PREDNISONE 50 MG: 20 TABLET ORAL at 18:48

## 2020-07-22 ENCOUNTER — TELEPHONE (OUTPATIENT)
Dept: ADMINISTRATIVE | Facility: OTHER | Age: 25
End: 2020-07-22

## 2020-07-22 NOTE — TELEPHONE ENCOUNTER
----- Message from Niecy Rodriguez MA sent at 7/22/2020 11:12 AM EDT -----  Regarding: hiv  07/22/20 11:12 AM    Hello, our patient Judith Osuna has had HIV completed/performed  Please assist in updating the patient chart by pulling the Care Everywhere (CE) document  The date of service is 2010       Thank you,  Niecy Rodriguez MA  [facility] PRIMARY CARE

## 2020-07-22 NOTE — TELEPHONE ENCOUNTER
Upon review of the In Basket request we were able to locate, review, and update the patient chart as requested for HIV  Any additional questions or concerns should be emailed to the Practice Liaisons via [email or web address] email, please do not reply via In Basket      Thank you  Mariah Rodriguez

## 2020-07-24 ENCOUNTER — TRANSCRIBE ORDERS (OUTPATIENT)
Dept: LAB | Facility: CLINIC | Age: 25
End: 2020-07-24

## 2020-07-24 ENCOUNTER — OFFICE VISIT (OUTPATIENT)
Dept: FAMILY MEDICINE CLINIC | Facility: CLINIC | Age: 25
End: 2020-07-24
Payer: COMMERCIAL

## 2020-07-24 ENCOUNTER — APPOINTMENT (OUTPATIENT)
Dept: LAB | Facility: CLINIC | Age: 25
End: 2020-07-24
Payer: COMMERCIAL

## 2020-07-24 VITALS
SYSTOLIC BLOOD PRESSURE: 120 MMHG | WEIGHT: 179 LBS | DIASTOLIC BLOOD PRESSURE: 68 MMHG | BODY MASS INDEX: 30.56 KG/M2 | HEART RATE: 84 BPM | TEMPERATURE: 99.3 F | HEIGHT: 64 IN | OXYGEN SATURATION: 99 %

## 2020-07-24 DIAGNOSIS — Z13.1 SCREENING FOR DIABETES MELLITUS: ICD-10-CM

## 2020-07-24 DIAGNOSIS — Z13.0 SCREENING FOR DEFICIENCY ANEMIA: ICD-10-CM

## 2020-07-24 DIAGNOSIS — E66.09 CLASS 1 OBESITY DUE TO EXCESS CALORIES WITHOUT SERIOUS COMORBIDITY WITH BODY MASS INDEX (BMI) OF 30.0 TO 30.9 IN ADULT: ICD-10-CM

## 2020-07-24 DIAGNOSIS — Z82.69 FAMILY HISTORY OF SYSTEMIC LUPUS ERYTHEMATOSUS (SLE) IN MOTHER: ICD-10-CM

## 2020-07-24 DIAGNOSIS — Z13.29 SCREENING FOR THYROID DISORDER: ICD-10-CM

## 2020-07-24 DIAGNOSIS — R53.83 FATIGUE, UNSPECIFIED TYPE: ICD-10-CM

## 2020-07-24 DIAGNOSIS — Z13.220 SCREENING FOR HYPERLIPIDEMIA: ICD-10-CM

## 2020-07-24 DIAGNOSIS — Z13.31 DEPRESSION SCREENING NEGATIVE: ICD-10-CM

## 2020-07-24 DIAGNOSIS — R53.83 FATIGUE, UNSPECIFIED TYPE: Primary | ICD-10-CM

## 2020-07-24 DIAGNOSIS — Z00.01 ENCOUNTER FOR GENERAL ADULT MEDICAL EXAMINATION WITH ABNORMAL FINDINGS: ICD-10-CM

## 2020-07-24 DIAGNOSIS — E55.9 VITAMIN D DEFICIENCY: ICD-10-CM

## 2020-07-24 DIAGNOSIS — F41.9 ANXIETY: ICD-10-CM

## 2020-07-24 DIAGNOSIS — Z00.00 ANNUAL PHYSICAL EXAM: ICD-10-CM

## 2020-07-24 PROBLEM — E66.811 CLASS 1 OBESITY DUE TO EXCESS CALORIES WITHOUT SERIOUS COMORBIDITY WITH BODY MASS INDEX (BMI) OF 30.0 TO 30.9 IN ADULT: Status: ACTIVE | Noted: 2020-07-24

## 2020-07-24 LAB
25(OH)D3 SERPL-MCNC: 12.2 NG/ML (ref 30–100)
ALBUMIN SERPL BCP-MCNC: 3.9 G/DL (ref 3.5–5)
ALP SERPL-CCNC: 87 U/L (ref 46–116)
ALT SERPL W P-5'-P-CCNC: 35 U/L (ref 12–78)
ANION GAP SERPL CALCULATED.3IONS-SCNC: 5 MMOL/L (ref 4–13)
AST SERPL W P-5'-P-CCNC: 18 U/L (ref 5–45)
BASOPHILS # BLD AUTO: 0.03 THOUSANDS/ΜL (ref 0–0.1)
BASOPHILS NFR BLD AUTO: 1 % (ref 0–1)
BILIRUB SERPL-MCNC: 1.37 MG/DL (ref 0.2–1)
BUN SERPL-MCNC: 18 MG/DL (ref 5–25)
CALCIUM SERPL-MCNC: 9.1 MG/DL (ref 8.3–10.1)
CHLORIDE SERPL-SCNC: 105 MMOL/L (ref 100–108)
CHOLEST SERPL-MCNC: 211 MG/DL (ref 50–200)
CO2 SERPL-SCNC: 25 MMOL/L (ref 21–32)
CREAT SERPL-MCNC: 0.85 MG/DL (ref 0.6–1.3)
CRP SERPL QL: <3 MG/L
EOSINOPHIL # BLD AUTO: 0.06 THOUSAND/ΜL (ref 0–0.61)
EOSINOPHIL NFR BLD AUTO: 1 % (ref 0–6)
ERYTHROCYTE [DISTWIDTH] IN BLOOD BY AUTOMATED COUNT: 12.5 % (ref 11.6–15.1)
GFR SERPL CREATININE-BSD FRML MDRD: 96 ML/MIN/1.73SQ M
GLUCOSE P FAST SERPL-MCNC: 81 MG/DL (ref 65–99)
HCT VFR BLD AUTO: 43.8 % (ref 34.8–46.1)
HDLC SERPL-MCNC: 45 MG/DL
HGB BLD-MCNC: 14.6 G/DL (ref 11.5–15.4)
IMM GRANULOCYTES # BLD AUTO: 0.01 THOUSAND/UL (ref 0–0.2)
IMM GRANULOCYTES NFR BLD AUTO: 0 % (ref 0–2)
LDLC SERPL CALC-MCNC: 144 MG/DL (ref 0–100)
LYMPHOCYTES # BLD AUTO: 1.43 THOUSANDS/ΜL (ref 0.6–4.47)
LYMPHOCYTES NFR BLD AUTO: 26 % (ref 14–44)
MCH RBC QN AUTO: 29.4 PG (ref 26.8–34.3)
MCHC RBC AUTO-ENTMCNC: 33.3 G/DL (ref 31.4–37.4)
MCV RBC AUTO: 88 FL (ref 82–98)
MONOCYTES # BLD AUTO: 0.36 THOUSAND/ΜL (ref 0.17–1.22)
MONOCYTES NFR BLD AUTO: 7 % (ref 4–12)
NEUTROPHILS # BLD AUTO: 3.58 THOUSANDS/ΜL (ref 1.85–7.62)
NEUTS SEG NFR BLD AUTO: 65 % (ref 43–75)
NONHDLC SERPL-MCNC: 166 MG/DL
NRBC BLD AUTO-RTO: 0 /100 WBCS
PLATELET # BLD AUTO: 231 THOUSANDS/UL (ref 149–390)
PMV BLD AUTO: 10.9 FL (ref 8.9–12.7)
POTASSIUM SERPL-SCNC: 4.1 MMOL/L (ref 3.5–5.3)
PROT SERPL-MCNC: 7.5 G/DL (ref 6.4–8.2)
RBC # BLD AUTO: 4.97 MILLION/UL (ref 3.81–5.12)
SODIUM SERPL-SCNC: 135 MMOL/L (ref 136–145)
TRIGL SERPL-MCNC: 108 MG/DL
TSH SERPL DL<=0.05 MIU/L-ACNC: 2.17 UIU/ML (ref 0.36–3.74)
WBC # BLD AUTO: 5.47 THOUSAND/UL (ref 4.31–10.16)

## 2020-07-24 PROCEDURE — 99395 PREV VISIT EST AGE 18-39: CPT | Performed by: NURSE PRACTITIONER

## 2020-07-24 PROCEDURE — 4004F PT TOBACCO SCREEN RCVD TLK: CPT | Performed by: NURSE PRACTITIONER

## 2020-07-24 PROCEDURE — 80061 LIPID PANEL: CPT

## 2020-07-24 PROCEDURE — 82306 VITAMIN D 25 HYDROXY: CPT

## 2020-07-24 PROCEDURE — 84443 ASSAY THYROID STIM HORMONE: CPT

## 2020-07-24 PROCEDURE — 86618 LYME DISEASE ANTIBODY: CPT

## 2020-07-24 PROCEDURE — 80053 COMPREHEN METABOLIC PANEL: CPT

## 2020-07-24 PROCEDURE — 99213 OFFICE O/P EST LOW 20 MIN: CPT | Performed by: NURSE PRACTITIONER

## 2020-07-24 PROCEDURE — 86140 C-REACTIVE PROTEIN: CPT

## 2020-07-24 PROCEDURE — 3008F BODY MASS INDEX DOCD: CPT | Performed by: NURSE PRACTITIONER

## 2020-07-24 PROCEDURE — 36415 COLL VENOUS BLD VENIPUNCTURE: CPT

## 2020-07-24 PROCEDURE — 85025 COMPLETE CBC W/AUTO DIFF WBC: CPT

## 2020-07-24 PROCEDURE — 86038 ANTINUCLEAR ANTIBODIES: CPT

## 2020-07-24 NOTE — PROGRESS NOTES
Assessment/Plan:    Problem List Items Addressed This Visit     Anxiety    Vitamin D deficiency    Relevant Orders    Vitamin D 25 hydroxy    Class 1 obesity due to excess calories without serious comorbidity with body mass index (BMI) of 30 0 to 30 9 in adult    Family history of systemic lupus erythematosus (SLE) in mother    Relevant Orders    LEISA Screen w/ Reflex to Titer/Pattern      Other Visit Diagnoses     Fatigue, unspecified type    -  Primary    Relevant Orders    C-reactive protein    Lyme Antibody Profile with reflex to WB    Screening for deficiency anemia        Relevant Orders    CBC and differential    Screening for diabetes mellitus        Relevant Orders    Comprehensive metabolic panel    Screening for hyperlipidemia        Relevant Orders    Lipid panel    Screening for thyroid disorder        Relevant Orders    TSH, 3rd generation with Free T4 reflex    Encounter for general adult medical examination with abnormal findings        Annual physical exam        Depression screening negative               Diagnoses and all orders for this visit:    Fatigue, unspecified type  -     C-reactive protein; Future  -     Lyme Antibody Profile with reflex to WB; Future    Screening for deficiency anemia  -     CBC and differential; Future    Screening for diabetes mellitus  -     Comprehensive metabolic panel; Future    Screening for hyperlipidemia  -     Lipid panel; Future    Screening for thyroid disorder  -     TSH, 3rd generation with Free T4 reflex; Future    Vitamin D deficiency  -     Vitamin D 25 hydroxy; Future    Class 1 obesity due to excess calories without serious comorbidity with body mass index (BMI) of 30 0 to 30 9 in adult  Comments:  Discussed diet modification and exercise regimen    Anxiety    Family history of systemic lupus erythematosus (SLE) in mother  -     LEISA Screen w/ Reflex to Titer/Pattern;  Future    Encounter for general adult medical examination with abnormal findings    Annual physical exam    Depression screening negative    Other orders  -     Cancel: TSH, 3rd generation with Free T4 reflex; Future        No problem-specific Assessment & Plan notes found for this encounter  Subjective:      Patient ID: Chloé Martinez is a 21 y o  female  Chloé Martinez presents with fatigue and increase of weight  Patient states that she has been exercising and watching what she eats and feels that she is a generally healthy diet  Despite this there has been noted 15 lb increase since February 2020  Patient also complains of fatigue and some minor joint pain  She does state that a number years ago she was tested twice for Lyme disease  She states that 1 test was negative and another test was positive  Not able to verify this in the past serological results  She reports that she was given prednisone and no antibiotics  Patient does state that her mother has lupus and thyroid issues  Fatigue   The current episode started more than 1 month ago  The problem occurs daily  The problem has been gradually worsening  Associated symptoms include fatigue  Pertinent negatives include no abdominal pain, anorexia, arthralgias, change in bowel habit, chest pain, chills, congestion, coughing, diaphoresis, fever, headaches, joint swelling, myalgias, nausea, neck pain, numbness, rash, sore throat, swollen glands, urinary symptoms, vertigo, visual change, vomiting or weakness  Nothing aggravates the symptoms  She has tried nothing for the symptoms  Thyroid Problem   Presents for initial visit  Symptoms include fatigue and weight gain  Patient reports no anxiety, cold intolerance, constipation, depressed mood, diaphoresis, diarrhea, dry skin, hair loss, heat intolerance, hoarse voice, leg swelling, menstrual problem, nail problem, palpitations, tremors, visual change or weight loss  Risk factors: Family history of thyroid issues         The following portions of the patient's history were reviewed and updated as appropriate:   She has a past medical history of Anxiety (3/10/2015), CMV (cytomegalovirus infection) ([facility] [address] ), Colon polyp, Drug addiction ([facility] [address] ), and Family hx of colon cancer  ,  does not have any pertinent problems on file  ,   has a past surgical history that includes Elbow surgery (Right); TONSILECTOMY AND ADNOIDECTOMY; Colonoscopy; Tonsillectomy; and Triceps tendon release (Right)  ,  family history includes Colon cancer in her mother; Diabetes in her mother; Hypertension in her sister; Lupus in her mother; No Known Problems in her father; Rheumatologic disease in her mother  ,   reports that she has been smoking cigarettes  She has a 9 00 pack-year smoking history  She has never used smokeless tobacco  She reports that she drank alcohol  She reports that she does not use drugs  ,  is allergic to morphine and related     No current outpatient medications on file  No current facility-administered medications for this visit  PHQ-9 Depression Screening    PHQ-9:    Frequency of the following problems over the past two weeks:       Little interest or pleasure in doing things:  0 - not at all  Feeling down, depressed, or hopeless:  0 - not at all  PHQ-2 Score:  0           Review of Systems   Constitutional: Positive for fatigue, unexpected weight change and weight gain  Negative for appetite change, chills, diaphoresis, fever and weight loss  HENT: Negative for congestion, hoarse voice and sore throat  Respiratory: Negative for cough  Cardiovascular: Negative for chest pain and palpitations  Gastrointestinal: Negative for abdominal pain, anorexia, change in bowel habit, constipation, diarrhea, nausea and vomiting  Endocrine: Negative for cold intolerance and heat intolerance  Genitourinary: Negative for menstrual problem  Musculoskeletal: Negative for arthralgias, joint swelling, myalgias and neck pain  Skin: Negative for rash     Neurological: Negative for vertigo, tremors, weakness, numbness and headaches  Psychiatric/Behavioral: The patient is not nervous/anxious  All other systems reviewed and are negative  Objective:  Vitals:    07/24/20 1022   BP: 120/68   BP Location: Left arm   Patient Position: Sitting   Cuff Size: Standard   Pulse: 84   Temp: 99 3 °F (37 4 °C)   TempSrc: Tympanic   SpO2: 99%   Weight: 81 2 kg (179 lb)   Height: 5' 4" (1 626 m)     Body mass index is 30 73 kg/m²  BMI Counseling: Body mass index is 30 73 kg/m²  Discussed the patient's BMI with her  The BMI is above normal  Nutrition recommendations include reducing portion sizes, decreasing overall calorie intake, 3-5 servings of fruits/vegetables daily, reducing fast food intake, consuming healthier snacks, decreasing soda and/or juice intake, moderation in carbohydrate intake, increasing intake of lean protein, reducing intake of saturated fat and trans fat and reducing intake of cholesterol  Exercise recommendations include vigorous aerobic physical activity for 75 minutes/week, exercising 3-5 times per week, joining a gym and strength training exercises  Physical Exam   Constitutional: She is oriented to person, place, and time  She appears well-developed and well-nourished  She is cooperative  HENT:   Head: Normocephalic and atraumatic  Right Ear: Tympanic membrane, external ear and ear canal normal    Left Ear: Tympanic membrane, external ear and ear canal normal    Nose: Nose normal    Mouth/Throat: Uvula is midline, oropharynx is clear and moist and mucous membranes are normal    Eyes: Pupils are equal, round, and reactive to light  Conjunctivae, EOM and lids are normal    Neck: Trachea normal, normal range of motion, full passive range of motion without pain and phonation normal  Neck supple  No JVD present  No thyroid mass and no thyromegaly present     Cardiovascular: Normal rate, regular rhythm, S1 normal, S2 normal, normal heart sounds, intact distal pulses and normal pulses  Exam reveals no gallop and no friction rub  No murmur heard  Pulmonary/Chest: Effort normal and breath sounds normal  She has no decreased breath sounds  Abdominal: Soft  Normal appearance and bowel sounds are normal  There is no hepatosplenomegaly  There is no tenderness  No hernia  Genitourinary:   Genitourinary Comments: Deferred    Musculoskeletal: Normal range of motion  Neurological: She is alert and oriented to person, place, and time  She has normal reflexes  No cranial nerve deficit  Skin: Skin is warm, dry and intact  Capillary refill takes less than 2 seconds  Psychiatric: She has a normal mood and affect  Her speech is normal and behavior is normal  Judgment and thought content normal  Cognition and memory are normal    Nursing note and vitals reviewed

## 2020-07-24 NOTE — PROGRESS NOTES
ADULT ANNUAL [address] CARE    NAME: Chad Bolanos  AGE: 21 y o  SEX: female  : 1995     DATE: 2020     Assessment and Plan:     Problem List Items Addressed This Visit     Anxiety    Vitamin D deficiency    Relevant Orders    Vitamin D 25 hydroxy    Class 1 obesity due to excess calories without serious comorbidity with body mass index (BMI) of 30 0 to 30 9 in adult    Family history of systemic lupus erythematosus (SLE) in mother    Relevant Orders    LEISA Screen w/ Reflex to Titer/Pattern      Other Visit Diagnoses     Fatigue, unspecified type    -  Primary    Relevant Orders    C-reactive protein    Lyme Antibody Profile with reflex to WB    Screening for deficiency anemia        Relevant Orders    CBC and differential    Screening for diabetes mellitus        Relevant Orders    Comprehensive metabolic panel    Screening for hyperlipidemia        Relevant Orders    Lipid panel    Screening for thyroid disorder        Relevant Orders    TSH, 3rd generation with Free T4 reflex    Encounter for general adult medical examination with abnormal findings        Annual physical exam        Depression screening negative              Immunizations and preventive care screenings were discussed with patient today  Appropriate education was printed on patient's after visit summary  Counseling:  Alcohol/drug use: discussed moderation in alcohol intake, the recommendations for healthy alcohol use, and avoidance of illicit drug use  Dental Health: discussed importance of regular tooth brushing, flossing, and dental visits  Injury prevention: discussed safety/seat belts, safety helmets, smoke detectors, carbon dioxide detectors, and smoking near bedding or upholstery  Sexual health: discussed sexually transmitted diseases, partner selection, use of condoms, avoidance of unintended pregnancy, and contraceptive alternatives    · Exercise: the importance of regular exercise/physical activity was discussed  Recommend exercise 3-5 times per week for at least 30 minutes  Return in about 2 weeks (around 8/7/2020)  Chief Complaint:     Chief Complaint   Patient presents with    Weight Gain     complains of weight gain, hot flashes, feels like she has a lump in her throat       History of Present Illness:     Adult Annual Physical   Patient here for a comprehensive physical exam  The patient reports problems - Fatigue, joint pain, hot flashes  Diet and Physical Activity  · Diet/Nutrition: well balanced diet, consuming 3-5 servings of fruits/vegetables daily, adequate fiber intake and adequate whole grain intake  · Exercise: moderate cardiovascular exercise, 3-4 times a week on average and 30-60 minutes on average  Depression Screening  PHQ-9 Depression Screening    PHQ-9:    Frequency of the following problems over the past two weeks:       Little interest or pleasure in doing things:  0 - not at all  Feeling down, depressed, or hopeless:  0 - not at all  PHQ-2 Score:  0       General Health  · Sleep: sleeps well and gets 7-8 hours of sleep on average  · Hearing: normal - bilateral   · Vision: goes for regular eye exams, most recent eye exam >1 year ago and wears glasses  · Dental: regular dental visits and brushes teeth once daily  /GYN Health  · Last menstrual period: July  · Contraceptive method: barrier methods  · History of STDs?: no      Review of Systems:     Review of Systems   Constitutional: Positive for fatigue and unexpected weight change  HENT: Negative  Eyes: Negative  Respiratory: Negative  Cardiovascular: Negative  Gastrointestinal: Negative  Endocrine: Negative  Genitourinary: Negative  Musculoskeletal: Negative  Skin: Negative  Allergic/Immunologic: Negative  Neurological: Negative  Hematological: Negative  Psychiatric/Behavioral: Negative         Past Medical History:     Past Medical History:   Diagnosis Date    Anxiety 3/10/2015    CMV [facility]cytomegalovirus infection) ([facility] [address] )     age 8    Colon polyp     Drug addiction ([facility] )     Family hx of colon cancer     mother      Past Surgical History:     Past Surgical History:   Procedure Laterality Date    COLONOSCOPY      ELBOW SURGERY Right     TONSILECTOMY AND ADNOIDECTOMY      TONSILLECTOMY      TRICEPS TENDON RELEASE Right       Social History:     E-Cigarette/Vaping    E-Cigarette Use Never User      E-Cigarette/Vaping Substances    Nicotine No     THC No     CBD No     Flavoring No     Other No     Unknown No      Social History     Socioeconomic History    Marital status: Single     Spouse name: None    Number of children: 1    Years of education: None    Highest education level: None   Occupational History    Occupation: Unemployed   Social Needs    Financial resource strain: None    Food insecurity:     Worry: None     Inability: None    Transportation needs:     Medical: None     Non-medical: None   Tobacco Use    Smoking status: Current Every Day Smoker     Packs/day: 1 00     Years: 9 00     Pack years: 9 00     Types: Cigarettes    Smokeless tobacco: Never Used   Substance and Sexual Activity    Alcohol use: Not Currently     Frequency: Monthly or less    Drug use: No    Sexual activity: Never   Lifestyle    Physical activity:     Days per week: None     Minutes per session: None    Stress: None   Relationships    Social connections:     Talks on phone: None     Gets together: None     Attends Congregational service: None     Active member of club or organization: None     Attends meetings of clubs or organizations: None     Relationship status: None    Intimate partner violence:     Fear of current or ex partner: None     Emotionally abused: None     Physically abused: None     Forced sexual activity: None   Other Topics Concern    None   Social History Narrative    Always uses seatbelt Caffeine use, 6-8 servings daily    Father has high school/GED    Mother has high school/GED    In Graduate school    Lack exercise    Pets: Reptile    Poorly balanced diet          Family History:     Family History   Problem Relation Age of Onset    Diabetes Mother     Lupus Mother     Rheumatologic disease Mother     Colon cancer Mother     No Known Problems Father     Hypertension Sister     Cancer Neg Hx       Current Medications:     No current outpatient medications on file  No current facility-administered medications for this visit  Allergies: Allergies   Allergen Reactions    Morphine And Related Hives      Physical Exam:     /68 (BP Location: Left arm, Patient Position: Sitting, Cuff Size: Standard)   Pulse 84   Temp 99 3 °F (37 4 °C) (Tympanic)   Ht 5' 4" (1 626 m)   Wt 81 2 kg (179 lb)   LMP 07/13/2020 (Approximate)   SpO2 99%   BMI 30 73 kg/m²     Physical Exam   Constitutional: She is oriented to person, place, and time  Vital signs are normal  She appears well-developed and well-nourished  She is cooperative  HENT:   Head: Normocephalic and atraumatic  Right Ear: Tympanic membrane, external ear and ear canal normal    Left Ear: Tympanic membrane, external ear and ear canal normal    Nose: Nose normal    Mouth/Throat: Uvula is midline, oropharynx is clear and moist and mucous membranes are normal    Eyes: Pupils are equal, round, and reactive to light  Conjunctivae, EOM and lids are normal    Neck: Trachea normal, normal range of motion, full passive range of motion without pain and phonation normal  Neck supple  No JVD present  No thyroid mass and no thyromegaly present  Cardiovascular: Normal rate, regular rhythm, S1 normal, S2 normal, normal heart sounds, intact distal pulses and normal pulses  Exam reveals no gallop and no friction rub  No murmur heard  Pulmonary/Chest: Effort normal and breath sounds normal  She has no decreased breath sounds  Abdominal: Soft  Normal appearance and bowel sounds are normal  There is no hepatosplenomegaly  There is no tenderness  No hernia  Genitourinary:   Genitourinary Comments: Deferred    Musculoskeletal: Normal range of motion  Neurological: She is alert and oriented to person, place, and time  She has normal reflexes  No cranial nerve deficit  Skin: Skin is warm, dry and intact  Capillary refill takes less than 2 seconds  Psychiatric: She has a normal mood and affect  Her speech is normal and behavior is normal  Judgment and thought content normal  Cognition and memory are normal    Nursing note and vitals reviewed        Frederick Spears, [address] [place] [address]

## 2020-07-24 NOTE — PATIENT INSTRUCTIONS
Fatigue   AMBULATORY CARE:   Fatigue  is mental and physical exhaustion that does not get better with rest  Fatigue may make daily activities difficult or cause extreme sleepiness  It is normal to feel tired sometimes, but long-term fatigue may be a sign of serious illness  Seek care immediately if:   · You have chest pain  · You have difficulty breathing  Contact your healthcare provider if:   · You have a cough that gets worse, or does not go away  · You see blood in your urine or bowel movement  · You have numbness or tingling around your mouth or in an arm or leg  · You faint, feel dizzy, or have vision changes  · You have swelling in your lymph nodes  · You are a woman and have vaginal bleeding that is not normal for you, or is not expected  · You lose weight without trying, or you have trouble eating  · You feel weak or have muscle pain  · You have pain or swelling in your joints  · You have questions or concerns about your condition or care  Manage fatigue:   · Keep a fatigue diary  Include anything that makes you feel more tired or less tired  Bring the diary with you to follow-up visits with your provider  · Exercise as directed  Exercise can help you feel more alert  Exercise can also help you manage stress or relieve depression  Try to get at least 30 minutes of exercise most days of the week  · Keep a regular sleep schedule  Go to bed and wake up at the same times every day  Limit naps to 1 hour each day  A nap can improve fatigue, but a long nap may make it harder to go to sleep at night  · Plan and limit your activities  Limit the number of activities such as shopping and cleaning you do each day  If possible, try to spread out your trips throughout the week  Plan ahead so you are not rushing to get something done  Only do activities that you have the energy to complete  Take breaks between activities  Ask for help if you need it   Another person may be able to drive you or help with daily activities  · Eat a variety of healthy foods  Healthy foods include fruits, vegetables, whole-grain breads, low-fat dairy products, beans, lean meats, and fish  Good nutrition can help manage fatigue  · Limit caffeine and alcohol  These can make it difficult to fall or stay asleep  Women should limit alcohol to 1 drink a day  Men should limit alcohol to 2 drinks a day  A drink of alcohol is 12 ounces of beer, 5 ounces of wine, or 1½ ounces of liquor  Ask our healthcare provider how much caffeine is safe for you  · Do not smoke  Nicotine and other chemicals in cigarettes and cigars can cause lung damage and increase fatigue  Ask your healthcare provider for information if you currently smoke and need help to quit  E-cigarettes or smokeless tobacco still contain nicotine  Talk to your healthcare provider before you use these products  Follow up with your healthcare provider as directed: You may need more tests  Your healthcare provider may refer you to a specialist  Write down your questions so you remember to ask them during your visits  © 2017 [employer] Information is for End User's use only and may not be sold, redistributed or otherwise used for commercial purposes  All illustrations and images included in CareNotes® are the copyrighted property of [employer]  or Johan Leyva  The above information is an  only  It is not intended as medical advice for individual conditions or treatments  Talk to your doctor, nurse or pharmacist before following any medical regimen to see if it is safe and effective for you  Wellness Visit for Adults   AMBULATORY CARE:   A wellness visit  is when you see your healthcare provider to get screened for health problems  You can also get advice on how to stay healthy  Write down your questions so you remember to ask them   Ask your healthcare provider how often you should have a wellness visit  What happens at a wellness visit:  Your healthcare provider will ask about your health, and your family history of health problems  This includes high blood pressure, heart disease, and cancer  He or she will ask if you have symptoms that concern you, if you smoke, and about your mood  You may also be asked about your intake of medicines, supplements, food, and alcohol  Any of the following may be done:  · Your weight  will be checked  Your height may also be checked so your body mass index (BMI) can be calculated  Your BMI shows if you are at a healthy weight  · Your blood pressure  and heart rate will be checked  Your temperature may also be checked  · Blood and urine tests  may be done  Blood tests may be done to check your cholesterol levels  Abnormal cholesterol levels increase your risk for heart disease and stroke  You may also need a blood or urine test to check for diabetes if you are at increased risk  Urine tests may be done to look for signs of an infection or kidney disease  · A physical exam  includes checking your heartbeat and lungs with a stethoscope  Your healthcare provider may also check your skin to look for sun damage  · Screening tests  may be recommended  A screening test is done to check for diseases that may not cause symptoms  The screening tests you may need depend on your age, gender, family history, and lifestyle habits  For example, colorectal screening may be recommended if you are 48years old or older  Screening tests you need if you are a woman:   · A Pap smear  is used to screen for cervical cancer  Pap smears are usually done every 3 to 5 years depending on your age  You may need them more often if you have had abnormal Pap smear test results in the past  Ask your healthcare provider how often you should have a Pap smear  · A mammogram  is an x-ray of your breasts to screen for breast cancer   Experts recommend mammograms every 2 years starting at age 48 years  You may need a mammogram at age 52 years or younger if you have an increased risk for breast cancer  Talk to your healthcare provider about when you should start having mammograms and how often you need them  Vaccines you may need:   · Get an influenza vaccine  every year  The influenza vaccine protects you from the flu  Several types of viruses cause the flu  The viruses change over time, so new vaccines are made each year  · Get a tetanus-diphtheria (Td) booster vaccine  every 10 years  This vaccine protects you against tetanus and diphtheria  Tetanus is a severe infection that may cause painful muscle spasms and lockjaw  Diphtheria is a severe bacterial infection that causes a thick covering in the back of your mouth and throat  · Get a human papillomavirus (HPV) vaccine  if you are female and aged 23 to 32 or male 23 to 24 and never received it  This vaccine protects you from HPV infection  HPV is the most common infection spread by sexual contact  HPV may also cause vaginal, penile, and anal cancers  · Get a pneumococcal vaccine  if you are aged 72 years or older  The pneumococcal vaccine is an injection given to protect you from pneumococcal disease  Pneumococcal disease is an infection caused by pneumococcal bacteria  The infection may cause pneumonia, meningitis, or an ear infection  · Get a shingles vaccine  if you are aged 61 or older, even if you have had shingles before  The shingles vaccine is an injection to protect you from the varicella-zoster virus  This is the same virus that causes chickenpox  Shingles is a painful rash that develops in people who had chickenpox or have been exposed to the virus  How to eat healthy:  My Plate is a model for planning healthy meals  It shows the types and amounts of foods that should go on your plate  Fruits and vegetables make up about half of your plate, and grains and protein make up the other half   A serving of dairy is included on the side of your plate  The amount of calories and serving sizes you need depends on your age, gender, weight, and height  Examples of healthy foods are listed below:  · Eat a variety of vegetables  such as dark green, red, and orange vegetables  You can also include canned vegetables low in sodium (salt) and frozen vegetables without added butter or sauces  · Eat a variety of fresh fruits , canned fruit in 100% juice, frozen fruit, and dried fruit  · Include whole grains  At least half of the grains you eat should be whole grains  Examples include whole-wheat bread, wheat pasta, brown rice, and whole-grain cereals such as oatmeal     · Eat a variety of protein foods such as seafood (fish and shellfish), lean meat, and poultry without skin (turkey and chicken)  Examples of lean meats include pork leg, shoulder, or tenderloin, and beef round, sirloin, tenderloin, and extra lean ground beef  Other protein foods include eggs and egg substitutes, beans, peas, soy products, nuts, and seeds  · Choose low-fat dairy products such as skim or 1% milk or low-fat yogurt, cheese, and cottage cheese  · Limit unhealthy fats  such as butter, hard margarine, and shortening  Exercise:  Exercise at least 30 minutes per day on most days of the week  Some examples of exercise include walking, biking, dancing, and swimming  You can also fit in more physical activity by taking the stairs instead of the elevator or parking farther away from stores  Include muscle strengthening activities 2 days each week  Regular exercise provides many health benefits  It helps you manage your weight, and decreases your risk for type 2 diabetes, heart disease, stroke, and high blood pressure  Exercise can also help improve your mood  Ask your healthcare provider about the best exercise plan for you  General health and safety guidelines:   · Do not smoke    Nicotine and other chemicals in cigarettes and cigars can cause lung damage  Ask your healthcare provider for information if you currently smoke and need help to quit  E-cigarettes or smokeless tobacco still contain nicotine  Talk to your healthcare provider before you use these products  · Limit alcohol  A drink of alcohol is 12 ounces of beer, 5 ounces of wine, or 1½ ounces of liquor  · Lose weight, if needed  Being overweight increases your risk of certain health conditions  These include heart disease, high blood pressure, type 2 diabetes, and certain types of cancer  · Protect your skin  Do not sunbathe or use tanning beds  Use sunscreen with a SPF 15 or higher  Apply sunscreen at least 15 minutes before you go outside  Reapply sunscreen every 2 hours  Wear protective clothing, hats, and sunglasses when you are outside  · Drive safely  Always wear your seatbelt  Make sure everyone in your car wears a seatbelt  A seatbelt can save your life if you are in an accident  Do not use your cell phone when you are driving  This could distract you and cause an accident  Pull over if you need to make a call or send a text message  · Practice safe sex  Use latex condoms if are sexually active and have more than one partner  Your healthcare provider may recommend screening tests for sexually transmitted infections (STIs)  · Wear helmets, lifejackets, and protective gear  Always wear a helmet when you ride a bike or motorcycle, go skiing, or play sports that could cause a head injury  Wear protective equipment when you play sports  Wear a lifejacket when you are on a boat or doing water sports  © 2017 [employer]  Information is for End User's use only and may not be sold, redistributed or otherwise used for commercial purposes  All illustrations and images included in CareNotes® are the copyrighted property of [employer]  or Johna Leyva  The above information is an  only   It is not intended as medical advice for individual conditions or treatments  Talk to your doctor, nurse or pharmacist before following any medical regimen to see if it is safe and effective for you  Weight Management   AMBULATORY CARE:   Why it is important to manage your weight:  Being overweight increases your risk of health conditions such as heart disease, high blood pressure, type 2 diabetes, and certain types of cancer  It can also increase your risk for osteoarthritis, sleep apnea, and other respiratory problems  Aim for a slow, steady weight loss  Even a small amount of weight loss can lower your risk of health problems  How to lose weight safely:  A safe and healthy way to lose weight is to eat fewer calories and get regular exercise  You can lose up about 1 pound a week by decreasing the number of calories you eat by 500 calories each day  You can decrease calories by eating smaller portion sizes or by cutting out high-calorie foods  Read labels to find out how many calories are in the foods you eat  You can also burn calories with exercise such as walking, swimming, or biking  You will be more likely to keep weight off if you make these changes part of your lifestyle  Healthy meal plan for weight management:  A healthy meal plan includes a variety of foods, contains fewer calories, and helps you stay healthy  A healthy meal plan includes the following:  · Eat whole-grain foods more often  A healthy meal plan should contain fiber  Fiber is the part of grains, fruits, and vegetables that is not broken down by your body  Whole-grain foods are healthy and provide extra fiber in your diet  Some examples of whole-grain foods are whole-wheat breads and pastas, oatmeal, brown rice, and bulgur  · Eat a variety of vegetables every day  Include dark, leafy greens such as spinach, kale, yamileth greens, and mustard greens  Eat yellow and orange vegetables such as carrots, sweet potatoes, and winter squash  · Eat a variety of fruits every day  Choose fresh or canned fruit (canned in its own juice or light syrup) instead of juice  Fruit juice has very little or no fiber  · Eat low-fat dairy foods  Drink fat-free (skim) milk or 1% milk  Eat fat-free yogurt and low-fat cottage cheese  Try low-fat cheeses such as mozzarella and other reduced-fat cheeses  · Choose meat and other protein foods that are low in fat  Choose beans or other legumes such as split peas or lentils  Choose fish, skinless poultry (chicken or turkey), or lean cuts of red meat (beef or pork)  Before you cook meat or poultry, cut off any visible fat  · Use less fat and oil  Try baking foods instead of frying them  Add less fat, such as margarine, sour cream, regular salad dressing and mayonnaise to foods  Eat fewer high-fat foods  Some examples of high-fat foods include french fries, doughnuts, ice cream, and cakes  · Eat fewer sweets  Limit foods and drinks that are high in sugar  This includes candy, cookies, regular soda, and sweetened drinks  Ways to decrease calories:   · Eat smaller portions  ¨ Use a small plate with smaller servings  ¨ Do not eat second helpings  ¨ When you eat at a restaurant, ask for a box and place half of your meal in the box before you eat  ¨ Share an entrée with someone else  · Replace high-calorie snacks with healthy, low-calorie snacks  ¨ Choose fresh fruit, vegetables, fat-free rice cakes, or air-popped popcorn instead of potato chips, nuts, or chocolate  ¨ Choose water or calorie-free drinks instead of soda or sweetened drinks  · Eat regular meals  Skipping meals can lead to overeating later in the day  Eat a healthy snack in place of a meal if you do not have time to eat a regular meal      · Do not shop for groceries when you are hungry  You may be more likely to make unhealthy food choices  Take a grocery list of healthy foods and shop after you have eaten    Exercise:  Exercise at least 30 minutes per day on most days of the week  Some examples of exercise include walking, biking, dancing, and swimming  You can also fit in more physical activity by taking the stairs instead of the elevator or parking farther away from stores  Ask your healthcare provider about the best exercise plan for you  Other things to consider as you try to lose weight:   · Be aware of situations that may give you the urge to overeat, such as eating while watching television  Find ways to avoid these situations  For example, read a book, go for a walk, or do crafts  · Meet with a weight loss support group or friends who are also trying to lose weight  This may help you stay motivated to continue working on your weight loss goals  © 2017 [employer]  Information is for End User's use only and may not be sold, redistributed or otherwise used for commercial purposes  All illustrations and images included in CareNotes® are the copyrighted property of [employer]  or Johan Leyva  The above information is an  only  It is not intended as medical advice for individual conditions or treatments  Talk to your doctor, nurse or pharmacist before following any medical regimen to see if it is safe and effective for you  Obesity   AMBULATORY CARE:   Obesity  is when your body mass index (BMI) is greater than 30  Your healthcare provider will use your height and weight to measure your BMI  The risks of obesity include  many health problems, such as injuries or physical disability  You may need tests to check for the following:  · Diabetes     · High blood pressure or high cholesterol     · Heart disease     · Gallbladder or liver disease     · Cancer of the colon, breast, prostate, liver, or kidney     · Sleep apnea     · Arthritis or gout  Seek care immediately if:   · You have a severe headache, confusion, or difficulty speaking  · You have weakness on one side of your body       · You have chest pain, sweating, or shortness of breath  Contact your healthcare provider if:   · You have symptoms of gallbladder or liver disease, such as pain in your upper abdomen  · You have knee or hip pain and discomfort while walking  · You have symptoms of diabetes, such as intense hunger and thirst, and frequent urination  · You have symptoms of sleep apnea, such as snoring or daytime sleepiness  · You have questions or concerns about your condition or care  Treatment for obesity  focuses on helping you lose weight to improve your health  Even a small decrease in BMI can reduce the risk for many health problems  Your healthcare provider will help you set a weight-loss goal   · Lifestyle changes  are the first step in treating obesity  These include making healthy food choices and getting regular physical activity  Your healthcare provider may suggest a weight-loss program that involves coaching, education, and therapy  · Medicine  may help you lose weight when it is used with a healthy diet and physical activity  · Surgery  can help you lose weight if you are very obese and have other health problems  There are several types of weight-loss surgery  Ask your healthcare provider for more information  Be successful losing weight:   · Set small, realistic goals  An example of a small goal is to walk for 20 minutes 5 days a week  Anther goal is to lose 5% of your body weight  · Tell friends, family members, and coworkers about your goals  and ask for their support  Ask a friend to lose weight with you, or join a weight-loss support group  · Identify foods or triggers that may cause you to overeat , and find ways to avoid them  Remove tempting high-calorie foods from your home and workplace  Place a bowl of fresh fruit on your kitchen counter  If stress causes you to eat, then find other ways to cope with stress  · Keep a diary to track what you eat and drink    Also write down how many minutes of physical activity you do each day  Weigh yourself once a week and record it in your diary  Eating changes: You will need to eat 500 to 1,000 fewer calories each day than you currently eat to lose 1 to 2 pounds a week  The following changes will help you cut calories:  · Eat smaller portions  Use small plates, no larger than 9 inches in diameter  Fill your plate half full of fruits and vegetables  Measure your food using measuring cups until you know what a serving size looks like  · Eat 3 meals and 1 or 2 snacks each day  Plan your meals in advance  [employer] and eat at home most of the time  Eat slowly  · Eat fruits and vegetables at every meal   They are low in calories and high in fiber, which makes you feel full  Do not add butter, margarine, or cream sauce to vegetables  Use herbs to season steamed vegetables  · Eat less fat and fewer fried foods  Eat more baked or grilled chicken and fish  These protein sources are lower in calories and fat than red meat  Limit fast food  Dress your salads with olive oil and vinegar instead of bottled dressing  · Limit the amount of sugar you eat  Do not drink sugary beverages  Limit alcohol  Activity changes:  Physical activity is good for your body in many ways  It helps you burn calories and build strong muscles  It decreases stress and depression, and improves your mood  It can also help you sleep better  Talk to your healthcare provider before you begin an exercise program   · Exercise for at least 30 minutes 5 days a week  Start slowly  Set aside time each day for physical activity that you enjoy and that is convenient for you  It is best to do both weight training and an activity that increases your heart rate, such as walking, bicycling, or swimming  · Find ways to be more active  Do yard work and housecleaning  Walk up the stairs instead of using elevators   Spend your leisure time going to events that require walking, such as outdoor festivals or fairs  This extra physical activity can help you lose weight and keep it off  Follow up with your healthcare provider as directed: You may need to meet with a dietitian  Write down your questions so you remember to ask them during your visits  © 2017 [employer] Information is for End User's use only and may not be sold, redistributed or otherwise used for commercial purposes  All illustrations and images included in CareNotes® are the copyrighted property of [employer]  or Johan Leyva  The above information is an  only  It is not intended as medical advice for individual conditions or treatments  Talk to your doctor, nurse or pharmacist before following any medical regimen to see if it is safe and effective for you  Wellness Visit for Adults   AMBULATORY CARE:   A wellness visit  is when you see your healthcare provider to get screened for health problems  You can also get advice on how to stay healthy  Write down your questions so you remember to ask them  Ask your healthcare provider how often you should have a wellness visit  What happens at a wellness visit:  Your healthcare provider will ask about your health, and your family history of health problems  This includes high blood pressure, heart disease, and cancer  He or she will ask if you have symptoms that concern you, if you smoke, and about your mood  You may also be asked about your intake of medicines, supplements, food, and alcohol  Any of the following may be done:  · Your weight  will be checked  Your height may also be checked so your body mass index (BMI) can be calculated  Your BMI shows if you are at a healthy weight  · Your blood pressure  and heart rate will be checked  Your temperature may also be checked  · Blood and urine tests  may be done  Blood tests may be done to check your cholesterol levels  Abnormal cholesterol levels increase your risk for heart disease and stroke  You may also need a blood or urine test to check for diabetes if you are at increased risk  Urine tests may be done to look for signs of an infection or kidney disease  · A physical exam  includes checking your heartbeat and lungs with a stethoscope  Your healthcare provider may also check your skin to look for sun damage  · Screening tests  may be recommended  A screening test is done to check for diseases that may not cause symptoms  The screening tests you may need depend on your age, gender, family history, and lifestyle habits  For example, colorectal screening may be recommended if you are 48years old or older  Screening tests you need if you are a woman:   · A Pap smear  is used to screen for cervical cancer  Pap smears are usually done every 3 to 5 years depending on your age  You may need them more often if you have had abnormal Pap smear test results in the past  Ask your healthcare provider how often you should have a Pap smear  · A mammogram  is an x-ray of your breasts to screen for breast cancer  Experts recommend mammograms every 2 years starting at age 48 years  You may need a mammogram at age 52 years or younger if you have an increased risk for breast cancer  Talk to your healthcare provider about when you should start having mammograms and how often you need them  Vaccines you may need:   · Get an influenza vaccine  every year  The influenza vaccine protects you from the flu  Several types of viruses cause the flu  The viruses change over time, so new vaccines are made each year  · Get a tetanus-diphtheria (Td) booster vaccine  every 10 years  This vaccine protects you against tetanus and diphtheria  Tetanus is a severe infection that may cause painful muscle spasms and lockjaw  Diphtheria is a severe bacterial infection that causes a thick covering in the back of your mouth and throat       · Get a human papillomavirus (HPV) vaccine  if you are female and aged 23 to 32 or male 19 to 21 and never received it  This vaccine protects you from HPV infection  HPV is the most common infection spread by sexual contact  HPV may also cause vaginal, penile, and anal cancers  · Get a pneumococcal vaccine  if you are aged 72 years or older  The pneumococcal vaccine is an injection given to protect you from pneumococcal disease  Pneumococcal disease is an infection caused by pneumococcal bacteria  The infection may cause pneumonia, meningitis, or an ear infection  · Get a shingles vaccine  if you are aged 61 or older, even if you have had shingles before  The shingles vaccine is an injection to protect you from the varicella-zoster virus  This is the same virus that causes chickenpox  Shingles is a painful rash that develops in people who had chickenpox or have been exposed to the virus  How to eat healthy:  My Plate is a model for planning healthy meals  It shows the types and amounts of foods that should go on your plate  Fruits and vegetables make up about half of your plate, and grains and protein make up the other half  A serving of dairy is included on the side of your plate  The amount of calories and serving sizes you need depends on your age, gender, weight, and height  Examples of healthy foods are listed below:  · Eat a variety of vegetables  such as dark green, red, and orange vegetables  You can also include canned vegetables low in sodium (salt) and frozen vegetables without added butter or sauces  · Eat a variety of fresh fruits , canned fruit in 100% juice, frozen fruit, and dried fruit  · Include whole grains  At least half of the grains you eat should be whole grains  Examples include whole-wheat bread, wheat pasta, brown rice, and whole-grain cereals such as oatmeal     · Eat a variety of protein foods such as seafood (fish and shellfish), lean meat, and poultry without skin (turkey and chicken)   Examples of lean meats include pork leg, shoulder, or tenderloin, and beef round, sirloin, tenderloin, and extra lean ground beef  Other protein foods include eggs and egg substitutes, beans, peas, soy products, nuts, and seeds  · Choose low-fat dairy products such as skim or 1% milk or low-fat yogurt, cheese, and cottage cheese  · Limit unhealthy fats  such as butter, hard margarine, and shortening  Exercise:  Exercise at least 30 minutes per day on most days of the week  Some examples of exercise include walking, biking, dancing, and swimming  You can also fit in more physical activity by taking the stairs instead of the elevator or parking farther away from stores  Include muscle strengthening activities 2 days each week  Regular exercise provides many health benefits  It helps you manage your weight, and decreases your risk for type 2 diabetes, heart disease, stroke, and high blood pressure  Exercise can also help improve your mood  Ask your healthcare provider about the best exercise plan for you  General health and safety guidelines:   · Do not smoke  Nicotine and other chemicals in cigarettes and cigars can cause lung damage  Ask your healthcare provider for information if you currently smoke and need help to quit  E-cigarettes or smokeless tobacco still contain nicotine  Talk to your healthcare provider before you use these products  · Limit alcohol  A drink of alcohol is 12 ounces of beer, 5 ounces of wine, or 1½ ounces of liquor  · Lose weight, if needed  Being overweight increases your risk of certain health conditions  These include heart disease, high blood pressure, type 2 diabetes, and certain types of cancer  · Protect your skin  Do not sunbathe or use tanning beds  Use sunscreen with a SPF 15 or higher  Apply sunscreen at least 15 minutes before you go outside  Reapply sunscreen every 2 hours  Wear protective clothing, hats, and sunglasses when you are outside  · Drive safely  Always wear your seatbelt   Make sure everyone in your car wears a seatbelt  A seatbelt can save your life if you are in an accident  Do not use your cell phone when you are driving  This could distract you and cause an accident  Pull over if you need to make a call or send a text message  · Practice safe sex  Use latex condoms if are sexually active and have more than one partner  Your healthcare provider may recommend screening tests for sexually transmitted infections (STIs)  · Wear helmets, lifejackets, and protective gear  Always wear a helmet when you ride a bike or motorcycle, go skiing, or play sports that could cause a head injury  Wear protective equipment when you play sports  Wear a lifejacket when you are on a boat or doing water sports  © 2017 [facility] [facility] Information is for End User's use only and may not be sold, redistributed or otherwise used for commercial purposes  All illustrations and images included in CareNotes® are the copyrighted property of [employer]  or Johan Leyva  The above information is an  only  It is not intended as medical advice for individual conditions or treatments  Talk to your doctor, nurse or pharmacist before following any medical regimen to see if it is safe and effective for you  Wellness Visit for Adults   AMBULATORY CARE:   A wellness visit  is when you see your healthcare provider to get screened for health problems  You can also get advice on how to stay healthy  Write down your questions so you remember to ask them  Ask your healthcare provider how often you should have a wellness visit  What happens at a wellness visit:  Your healthcare provider will ask about your health, and your family history of health problems  This includes high blood pressure, heart disease, and cancer  He or she will ask if you have symptoms that concern you, if you smoke, and about your mood  You may also be asked about your intake of medicines, supplements, food, and alcohol   Any of the following may be done:  · Your weight  will be checked  Your height may also be checked so your body mass index (BMI) can be calculated  Your BMI shows if you are at a healthy weight  · Your blood pressure  and heart rate will be checked  Your temperature may also be checked  · Blood and urine tests  may be done  Blood tests may be done to check your cholesterol levels  Abnormal cholesterol levels increase your risk for heart disease and stroke  You may also need a blood or urine test to check for diabetes if you are at increased risk  Urine tests may be done to look for signs of an infection or kidney disease  · A physical exam  includes checking your heartbeat and lungs with a stethoscope  Your healthcare provider may also check your skin to look for sun damage  · Screening tests  may be recommended  A screening test is done to check for diseases that may not cause symptoms  The screening tests you may need depend on your age, gender, family history, and lifestyle habits  For example, colorectal screening may be recommended if you are 48years old or older  Screening tests you need if you are a woman:   · A Pap smear  is used to screen for cervical cancer  Pap smears are usually done every 3 to 5 years depending on your age  You may need them more often if you have had abnormal Pap smear test results in the past  Ask your healthcare provider how often you should have a Pap smear  · A mammogram  is an x-ray of your breasts to screen for breast cancer  Experts recommend mammograms every 2 years starting at age 48 years  You may need a mammogram at age 52 years or younger if you have an increased risk for breast cancer  Talk to your healthcare provider about when you should start having mammograms and how often you need them  Vaccines you may need:   · Get an influenza vaccine  every year  The influenza vaccine protects you from the flu  Several types of viruses cause the flu   The viruses change over time, so new vaccines are made each year  · Get a tetanus-diphtheria (Td) booster vaccine  every 10 years  This vaccine protects you against tetanus and diphtheria  Tetanus is a severe infection that may cause painful muscle spasms and lockjaw  Diphtheria is a severe bacterial infection that causes a thick covering in the back of your mouth and throat  · Get a human papillomavirus (HPV) vaccine  if you are female and aged 23 to 32 or male 23 to 24 and never received it  This vaccine protects you from HPV infection  HPV is the most common infection spread by sexual contact  HPV may also cause vaginal, penile, and anal cancers  · Get a pneumococcal vaccine  if you are aged 72 years or older  The pneumococcal vaccine is an injection given to protect you from pneumococcal disease  Pneumococcal disease is an infection caused by pneumococcal bacteria  The infection may cause pneumonia, meningitis, or an ear infection  · Get a shingles vaccine  if you are aged 61 or older, even if you have had shingles before  The shingles vaccine is an injection to protect you from the varicella-zoster virus  This is the same virus that causes chickenpox  Shingles is a painful rash that develops in people who had chickenpox or have been exposed to the virus  How to eat healthy:  My Plate is a model for planning healthy meals  It shows the types and amounts of foods that should go on your plate  Fruits and vegetables make up about half of your plate, and grains and protein make up the other half  A serving of dairy is included on the side of your plate  The amount of calories and serving sizes you need depends on your age, gender, weight, and height  Examples of healthy foods are listed below:  · Eat a variety of vegetables  such as dark green, red, and orange vegetables  You can also include canned vegetables low in sodium (salt) and frozen vegetables without added butter or sauces      · Eat a variety of fresh fruits , canned fruit in 100% juice, frozen fruit, and dried fruit  · Include whole grains  At least half of the grains you eat should be whole grains  Examples include whole-wheat bread, wheat pasta, brown rice, and whole-grain cereals such as oatmeal     · Eat a variety of protein foods such as seafood (fish and shellfish), lean meat, and poultry without skin (turkey and chicken)  Examples of lean meats include pork leg, shoulder, or tenderloin, and beef round, sirloin, tenderloin, and extra lean ground beef  Other protein foods include eggs and egg substitutes, beans, peas, soy products, nuts, and seeds  · Choose low-fat dairy products such as skim or 1% milk or low-fat yogurt, cheese, and cottage cheese  · Limit unhealthy fats  such as butter, hard margarine, and shortening  Exercise:  Exercise at least 30 minutes per day on most days of the week  Some examples of exercise include walking, biking, dancing, and swimming  You can also fit in more physical activity by taking the stairs instead of the elevator or parking farther away from stores  Include muscle strengthening activities 2 days each week  Regular exercise provides many health benefits  It helps you manage your weight, and decreases your risk for type 2 diabetes, heart disease, stroke, and high blood pressure  Exercise can also help improve your mood  Ask your healthcare provider about the best exercise plan for you  General health and safety guidelines:   · Do not smoke  Nicotine and other chemicals in cigarettes and cigars can cause lung damage  Ask your healthcare provider for information if you currently smoke and need help to quit  E-cigarettes or smokeless tobacco still contain nicotine  Talk to your healthcare provider before you use these products  · Limit alcohol  A drink of alcohol is 12 ounces of beer, 5 ounces of wine, or 1½ ounces of liquor  · Lose weight, if needed    Being overweight increases your risk of certain health conditions  These include heart disease, high blood pressure, type 2 diabetes, and certain types of cancer  · Protect your skin  Do not sunbathe or use tanning beds  Use sunscreen with a SPF 15 or higher  Apply sunscreen at least 15 minutes before you go outside  Reapply sunscreen every 2 hours  Wear protective clothing, hats, and sunglasses when you are outside  · Drive safely  Always wear your seatbelt  Make sure everyone in your car wears a seatbelt  A seatbelt can save your life if you are in an accident  Do not use your cell phone when you are driving  This could distract you and cause an accident  Pull over if you need to make a call or send a text message  · Practice safe sex  Use latex condoms if are sexually active and have more than one partner  Your healthcare provider may recommend screening tests for sexually transmitted infections (STIs)  · Wear helmets, lifejackets, and protective gear  Always wear a helmet when you ride a bike or motorcycle, go skiing, or play sports that could cause a head injury  Wear protective equipment when you play sports  Wear a lifejacket when you are on a boat or doing water sports  © 2017 [facility] Information is for End User's use only and may not be sold, redistributed or otherwise used for commercial purposes  All illustrations and images included in CareNotes® are the copyrighted property of [employer]  or Johan Leyva  The above information is an  only  It is not intended as medical advice for individual conditions or treatments  Talk to your doctor, nurse or pharmacist before following any medical regimen to see if it is safe and effective for you  Wellness Visit for Adults   AMBULATORY CARE:   A wellness visit  is when you see your healthcare provider to get screened for health problems  You can also get advice on how to stay healthy   Write down your questions so you remember to ask them  Ask your healthcare provider how often you should have a wellness visit  What happens at a wellness visit:  Your healthcare provider will ask about your health, and your family history of health problems  This includes high blood pressure, heart disease, and cancer  He or she will ask if you have symptoms that concern you, if you smoke, and about your mood  You may also be asked about your intake of medicines, supplements, food, and alcohol  Any of the following may be done:  · Your weight  will be checked  Your height may also be checked so your body mass index (BMI) can be calculated  Your BMI shows if you are at a healthy weight  · Your blood pressure  and heart rate will be checked  Your temperature may also be checked  · Blood and urine tests  may be done  Blood tests may be done to check your cholesterol levels  Abnormal cholesterol levels increase your risk for heart disease and stroke  You may also need a blood or urine test to check for diabetes if you are at increased risk  Urine tests may be done to look for signs of an infection or kidney disease  · A physical exam  includes checking your heartbeat and lungs with a stethoscope  Your healthcare provider may also check your skin to look for sun damage  · Screening tests  may be recommended  A screening test is done to check for diseases that may not cause symptoms  The screening tests you may need depend on your age, gender, family history, and lifestyle habits  For example, colorectal screening may be recommended if you are 48years old or older  Screening tests you need if you are a woman:   · A Pap smear  is used to screen for cervical cancer  Pap smears are usually done every 3 to 5 years depending on your age  You may need them more often if you have had abnormal Pap smear test results in the past  Ask your healthcare provider how often you should have a Pap smear       · A mammogram  is an x-ray of your breasts to screen for breast cancer  Experts recommend mammograms every 2 years starting at age 48 years  You may need a mammogram at age 52 years or younger if you have an increased risk for breast cancer  Talk to your healthcare provider about when you should start having mammograms and how often you need them  Vaccines you may need:   · Get an influenza vaccine  every year  The influenza vaccine protects you from the flu  Several types of viruses cause the flu  The viruses change over time, so new vaccines are made each year  · Get a tetanus-diphtheria (Td) booster vaccine  every 10 years  This vaccine protects you against tetanus and diphtheria  Tetanus is a severe infection that may cause painful muscle spasms and lockjaw  Diphtheria is a severe bacterial infection that causes a thick covering in the back of your mouth and throat  · Get a human papillomavirus (HPV) vaccine  if you are female and aged 23 to 32 or male 23 to 24 and never received it  This vaccine protects you from HPV infection  HPV is the most common infection spread by sexual contact  HPV may also cause vaginal, penile, and anal cancers  · Get a pneumococcal vaccine  if you are aged 72 years or older  The pneumococcal vaccine is an injection given to protect you from pneumococcal disease  Pneumococcal disease is an infection caused by pneumococcal bacteria  The infection may cause pneumonia, meningitis, or an ear infection  · Get a shingles vaccine  if you are aged 61 or older, even if you have had shingles before  The shingles vaccine is an injection to protect you from the varicella-zoster virus  This is the same virus that causes chickenpox  Shingles is a painful rash that develops in people who had chickenpox or have been exposed to the virus  How to eat healthy:  My Plate is a model for planning healthy meals  It shows the types and amounts of foods that should go on your plate   Fruits and vegetables make up about half of your plate, and grains and protein make up the other half  A serving of dairy is included on the side of your plate  The amount of calories and serving sizes you need depends on your age, gender, weight, and height  Examples of healthy foods are listed below:  · Eat a variety of vegetables  such as dark green, red, and orange vegetables  You can also include canned vegetables low in sodium (salt) and frozen vegetables without added butter or sauces  · Eat a variety of fresh fruits , canned fruit in 100% juice, frozen fruit, and dried fruit  · Include whole grains  At least half of the grains you eat should be whole grains  Examples include whole-wheat bread, wheat pasta, brown rice, and whole-grain cereals such as oatmeal     · Eat a variety of protein foods such as seafood (fish and shellfish), lean meat, and poultry without skin (turkey and chicken)  Examples of lean meats include pork leg, shoulder, or tenderloin, and beef round, sirloin, tenderloin, and extra lean ground beef  Other protein foods include eggs and egg substitutes, beans, peas, soy products, nuts, and seeds  · Choose low-fat dairy products such as skim or 1% milk or low-fat yogurt, cheese, and cottage cheese  · Limit unhealthy fats  such as butter, hard margarine, and shortening  Exercise:  Exercise at least 30 minutes per day on most days of the week  Some examples of exercise include walking, biking, dancing, and swimming  You can also fit in more physical activity by taking the stairs instead of the elevator or parking farther away from stores  Include muscle strengthening activities 2 days each week  Regular exercise provides many health benefits  It helps you manage your weight, and decreases your risk for type 2 diabetes, heart disease, stroke, and high blood pressure  Exercise can also help improve your mood  Ask your healthcare provider about the best exercise plan for you  General health and safety guidelines:   · Do not smoke    Nicotine and other chemicals in cigarettes and cigars can cause lung damage  Ask your healthcare provider for information if you currently smoke and need help to quit  E-cigarettes or smokeless tobacco still contain nicotine  Talk to your healthcare provider before you use these products  · Limit alcohol  A drink of alcohol is 12 ounces of beer, 5 ounces of wine, or 1½ ounces of liquor  · Lose weight, if needed  Being overweight increases your risk of certain health conditions  These include heart disease, high blood pressure, type 2 diabetes, and certain types of cancer  · Protect your skin  Do not sunbathe or use tanning beds  Use sunscreen with a SPF 15 or higher  Apply sunscreen at least 15 minutes before you go outside  Reapply sunscreen every 2 hours  Wear protective clothing, hats, and sunglasses when you are outside  · Drive safely  Always wear your seatbelt  Make sure everyone in your car wears a seatbelt  A seatbelt can save your life if you are in an accident  Do not use your cell phone when you are driving  This could distract you and cause an accident  Pull over if you need to make a call or send a text message  · Practice safe sex  Use latex condoms if are sexually active and have more than one partner  Your healthcare provider may recommend screening tests for sexually transmitted infections (STIs)  · Wear helmets, lifejackets, and protective gear  Always wear a helmet when you ride a bike or motorcycle, go skiing, or play sports that could cause a head injury  Wear protective equipment when you play sports  Wear a lifejacket when you are on a boat or doing water sports  © 2017 [facility] Information is for End User's use only and may not be sold, redistributed or otherwise used for commercial purposes  All illustrations and images included in CareNotes® are the copyrighted property of [employer]  or Johan Leyva    The above information is an  only  It is not intended as medical advice for individual conditions or treatments  Talk to your doctor, nurse or pharmacist before following any medical regimen to see if it is safe and effective for you

## 2020-07-25 LAB — B BURGDOR IGG+IGM SER-ACNC: <0.91 ISR (ref 0–0.9)

## 2020-07-27 LAB — RYE IGE QN: NEGATIVE

## 2020-07-31 ENCOUNTER — APPOINTMENT (EMERGENCY)
Dept: CT IMAGING | Facility: HOSPITAL | Age: 25
End: 2020-07-31
Payer: COMMERCIAL

## 2020-07-31 ENCOUNTER — HOSPITAL ENCOUNTER (EMERGENCY)
Facility: HOSPITAL | Age: 25
Discharge: HOME | End: 2020-07-31
Attending: EMERGENCY MEDICINE | Admitting: EMERGENCY MEDICINE
Payer: COMMERCIAL

## 2020-07-31 VITALS
SYSTOLIC BLOOD PRESSURE: 137 MMHG | HEART RATE: 103 BPM | RESPIRATION RATE: 18 BRPM | WEIGHT: 185.85 LBS | BODY MASS INDEX: 31.73 KG/M2 | DIASTOLIC BLOOD PRESSURE: 86 MMHG | TEMPERATURE: 97.3 F | HEIGHT: 64 IN | OXYGEN SATURATION: 97 %

## 2020-07-31 DIAGNOSIS — R09.89 GLOBUS SENSATION: Primary | ICD-10-CM

## 2020-07-31 PROCEDURE — 99282 EMERGENCY DEPT VISIT SF MDM: CPT | Performed by: EMERGENCY MEDICINE

## 2020-07-31 PROCEDURE — 70490 CT SOFT TISSUE NECK W/O DYE: CPT

## 2020-07-31 PROCEDURE — 99283 EMERGENCY DEPT VISIT LOW MDM: CPT

## 2020-08-01 NOTE — ED PROVIDER NOTES
History  Chief Complaint   Patient presents with    Neck Pain     c/o right sided neck pain and feeling like something is stuck in her throat x5 days  denies injury  This is a 41-year-old female with a history of anxiety who presents with right-sided throat globus sensation  Over the past 5 days, has been experiencing sensation that there is a lump in the right side of her throat  Denies any difficulty swallowing or sore throat  Denies any injury  Denies any difficulty breathing  She has never experienced this sensation before  The sensation has gotten worse over the past 5 days  Denies fever/chills, nausea/vomiting, lightheadedness/dizziness, numbness/weakness, headache, change in vision, URI symptoms, neck pain, chest pain, palpitations, shortness of breath, cough, back pain, flank pain, abdominal pain, diarrhea, hematochezia, melena, dysuria, hematuria, abnormal vaginal discharge/bleeding  None       Past Medical History:   Diagnosis Date    Anxiety 3/10/2015    CMV (cytomegalovirus infection) ([facility] )     age 8    Colon polyp     Drug addiction ([facility] )     Family hx of colon cancer     mother       Past Surgical History:   Procedure Laterality Date    COLONOSCOPY      ELBOW SURGERY Right     TONSILECTOMY AND ADNOIDECTOMY      TONSILLECTOMY      TRICEPS TENDON RELEASE Right        Family History   Problem Relation Age of Onset    Diabetes Mother     Lupus Mother     Rheumatologic disease Mother     Colon cancer Mother     No Known Problems Father     Hypertension Sister     Cancer Neg Hx      I have reviewed and agree with the history as documented      E-Cigarette/Vaping    E-Cigarette Use Never User      E-Cigarette/Vaping Substances    Nicotine No     THC No     CBD No     Flavoring No     Other No     Unknown No      Social History     Tobacco Use    Smoking status: Current Every Day Smoker     Packs/day: 1 00     Years: 9 00     Pack years: 9 00     Types: Cigarettes  Smokeless tobacco: Never Used   Substance Use Topics    Alcohol use: Not Currently     Frequency: Monthly or less    Drug use: No       Review of Systems   Constitutional: Negative for chills and fever  HENT: Negative for rhinorrhea, sore throat and trouble swallowing  Globus sensation   Eyes: Negative for photophobia and visual disturbance  Respiratory: Negative for cough, chest tightness and shortness of breath  Cardiovascular: Negative for chest pain, palpitations and leg swelling  Gastrointestinal: Negative for abdominal pain, blood in stool, diarrhea, nausea and vomiting  Endocrine: Negative for polyuria  Genitourinary: Negative for dysuria, flank pain, hematuria, vaginal bleeding and vaginal discharge  Musculoskeletal: Negative for back pain and neck pain  Skin: Negative for color change and rash  Allergic/Immunologic: Negative for immunocompromised state  Neurological: Negative for dizziness, weakness, light-headedness, numbness and headaches  All other systems reviewed and are negative  Physical Exam  Physical Exam   Constitutional: Vital signs are normal  She appears well-developed  She is cooperative  No distress  HENT:   Mouth/Throat: Uvula is midline, oropharynx is clear and moist and mucous membranes are normal  She does not have dentures  No oral lesions  No trismus in the jaw  Normal dentition  No dental abscesses, uvula swelling, lacerations or dental caries  No oropharyngeal exudate, posterior oropharyngeal edema, posterior oropharyngeal erythema or tonsillar abscesses  Eyes: Pupils are equal, round, and reactive to light  Conjunctivae and EOM are normal    Neck: Trachea normal  No thyroid mass and no thyromegaly present  Cardiovascular: Normal rate, regular rhythm, normal heart sounds, intact distal pulses and normal pulses  No murmur heard  Pulmonary/Chest: Effort normal and breath sounds normal    Abdominal: Soft   Normal appearance and bowel sounds are normal  There is no tenderness  There is no rebound, no guarding and no CVA tenderness  Neurological: She is alert  Skin: Skin is warm, dry and intact  Psychiatric: She has a normal mood and affect  Her speech is normal and behavior is normal  Thought content normal        Vital Signs  ED Triage Vitals [07/31/20 2103]   Temperature Pulse Respirations Blood Pressure SpO2   (!) 97 3 °F (36 3 °C) 103 18 137/86 97 %      Temp Source Heart Rate Source Patient Position - Orthostatic VS BP Location FiO2 (%)   Temporal Monitor Sitting Right arm --      Pain Score       5           Vitals:    07/31/20 2103   BP: 137/86   Pulse: 103   Patient Position - Orthostatic VS: Sitting         Visual Acuity      ED Medications  Medications - No data to display    Diagnostic Studies  Results Reviewed     None                 CT soft tissue neck wo contrast   Final Result by Ashu Montano MD (07/31 2202)      No acute CT findings  Workstation performed: [id number]                    Procedures  Procedures         ED Course       US AUDIT      Most Recent Value   Initial Alcohol Screen: US AUDIT-C    1  How often do you have a drink containing alcohol? 1 Filed at: 07/31/2020 2103   2  How many drinks containing alcohol do you have on a typical day you are drinking? 0 Filed at: 07/31/2020 2103   3b  FEMALE Any Age, or MALE 65+: How often do you have 4 or more drinks on one occassion? 0 Filed at: 07/31/2020 2103   Audit-C Score  1 Filed at: 07/31/2020 2103                                            [facility]  Number of Diagnoses or Management Options  Diagnosis management comments: Globus sensation of unknown source  Will check CT soft tissue neck  Discharge with follow-up to ENT if symptoms do not improve          Disposition  Final diagnoses:   Globus sensation     Time reflects when diagnosis was documented in both MDM as applicable and the Disposition within this note     Time User Action Codes Description Comment    7/31/2020 10:07 PM Penny Jah Add [R09 89] Globus sensation       ED Disposition     ED Disposition Condition Date/Time Comment    Discharge Stable Fri Jul 31, 2020 10:07 PM Shannon Sheets discharge to home/self care  Follow-up Information     Follow up With Specialties Details Why Contact Info Additional [address], [address] Internal Medicine, Nurse Practitioner Schedule an appointment as soon as possible for a visit   [address] [facility] [place], [address] [place] [phone]       [facility] Emergency Department Emergency Medicine Go to  If symptoms worsen [place] [id number] [phone] [place] ED, [facility], [place], [address] [facility], [address]    Josephine Goodpasture, MD Otolaryngology, Plastic Surgery Schedule an appointment as soon as possible for a visit  if symptoms do not improve [address] [facility] [address]  [phone]             Patient's Medications    No medications on file     No discharge procedures on file      PDMP Review     None          ED Provider  Electronically Signed by           Cherry Graham MD  07/31/20 3325

## 2020-08-27 ENCOUNTER — ANESTHESIA EVENT (OUTPATIENT)
Dept: GASTROENTEROLOGY | Facility: HOSPITAL | Age: 25
End: 2020-08-27

## 2020-08-27 PROBLEM — IMO0001 SMOKING: Status: ACTIVE | Noted: 2020-08-27

## 2020-08-27 PROBLEM — F32.A DEPRESSION: Status: ACTIVE | Noted: 2020-08-27

## 2020-08-27 PROBLEM — F17.200 SMOKING: Status: ACTIVE | Noted: 2020-08-27

## 2020-08-27 NOTE — ANESTHESIA PREPROCEDURE EVALUATION
Procedure:  COLONOSCOPY    Relevant Problems   GYN  pregnancy test - negative       NEURO/PSYCH   (+) Anxiety   (+) Hx of substance abuse ([address] ) (as teenager methamphatamine  Has not used for 10 years)      PULMONARY   (+) Smoking   (-) Asthma   (-) URI (upper respiratory infection)        Physical Exam    Airway    Mallampati score: I  TM Distance: >3 FB  Neck ROM: full     Dental   No notable dental hx     Cardiovascular  Cardiovascular exam normal    Pulmonary  Pulmonary exam normal     Other Findings        Anesthesia Plan  ASA Score- 2     Anesthesia Type- IV sedation with anesthesia with ASA Monitors  Additional Monitors:   Airway Plan:           Plan Factors-Exercise tolerance (METS): >4 METS  Chart reviewed  Patient is a current smoker  Patient instructed to abstain from smoking on day of procedure  Patient did not smoke on day of surgery  Induction-     Postoperative Plan-     Informed Consent- Anesthetic plan and risks discussed with patient  I personally reviewed this patient with the CRNA  Discussed and agreed on the Anesthesia Plan with the CRNA  Emelyn Wood

## 2020-08-28 ENCOUNTER — HOSPITAL ENCOUNTER (OUTPATIENT)
Dept: GASTROENTEROLOGY | Facility: HOSPITAL | Age: 25
Setting detail: OUTPATIENT SURGERY
Discharge: HOME | End: 2020-08-28
Attending: COLON & RECTAL SURGERY | Admitting: COLON & RECTAL SURGERY
Payer: COMMERCIAL

## 2020-08-28 ENCOUNTER — ANESTHESIA (OUTPATIENT)
Dept: GASTROENTEROLOGY | Facility: HOSPITAL | Age: 25
End: 2020-08-28

## 2020-08-28 VITALS
OXYGEN SATURATION: 97 % | TEMPERATURE: 97 F | WEIGHT: 177 LBS | BODY MASS INDEX: 30.22 KG/M2 | RESPIRATION RATE: 18 BRPM | DIASTOLIC BLOOD PRESSURE: 66 MMHG | SYSTOLIC BLOOD PRESSURE: 124 MMHG | HEIGHT: 64 IN | HEART RATE: 64 BPM

## 2020-08-28 DIAGNOSIS — Z86.010 PERSONAL HISTORY OF COLONIC POLYPS: ICD-10-CM

## 2020-08-28 PROBLEM — F19.11 HX OF SUBSTANCE ABUSE (HCC): Status: ACTIVE | Noted: 2020-08-28

## 2020-08-28 LAB
EXT PREGNANCY TEST URINE: NEGATIVE
EXT. CONTROL: NORMAL

## 2020-08-28 PROCEDURE — 81025 URINE PREGNANCY TEST: CPT | Performed by: COLON & RECTAL SURGERY

## 2020-08-28 PROCEDURE — 88305 TISSUE EXAM BY PATHOLOGIST: CPT | Performed by: PATHOLOGY

## 2020-08-28 RX ORDER — SODIUM CHLORIDE 9 MG/ML
INJECTION, SOLUTION INTRAVENOUS CONTINUOUS PRN
Status: DISCONTINUED | OUTPATIENT
Start: 2020-08-28 | End: 2020-08-28

## 2020-08-28 RX ORDER — SODIUM CHLORIDE 9 MG/ML
125 INJECTION, SOLUTION INTRAVENOUS CONTINUOUS
Status: DISCONTINUED | OUTPATIENT
Start: 2020-08-28 | End: 2020-09-01 | Stop reason: HOSPADM

## 2020-08-28 RX ORDER — PROPOFOL 10 MG/ML
INJECTION, EMULSION INTRAVENOUS AS NEEDED
Status: DISCONTINUED | OUTPATIENT
Start: 2020-08-28 | End: 2020-08-28

## 2020-08-28 RX ADMIN — PROPOFOL 50 MG: 10 INJECTION, EMULSION INTRAVENOUS at 09:13

## 2020-08-28 RX ADMIN — SODIUM CHLORIDE 125 ML/HR: 0.9 INJECTION, SOLUTION INTRAVENOUS at 08:12

## 2020-08-28 RX ADMIN — PROPOFOL 50 MG: 10 INJECTION, EMULSION INTRAVENOUS at 09:14

## 2020-08-28 RX ADMIN — PROPOFOL 50 MG: 10 INJECTION, EMULSION INTRAVENOUS at 09:17

## 2020-08-28 RX ADMIN — SODIUM CHLORIDE: 0.9 INJECTION, SOLUTION INTRAVENOUS at 07:51

## 2020-08-28 RX ADMIN — PROPOFOL 50 MG: 10 INJECTION, EMULSION INTRAVENOUS at 09:12

## 2020-08-28 RX ADMIN — PROPOFOL 50 MG: 10 INJECTION, EMULSION INTRAVENOUS at 09:10

## 2020-08-28 RX ADMIN — PROPOFOL 100 MG: 10 INJECTION, EMULSION INTRAVENOUS at 09:11

## 2020-08-28 NOTE — DISCHARGE INSTRUCTIONS
[facility] [address], [address] [place] [address] [address]  Phone: [phone]    DISCHARGE INSTRUCTIONS:    1   ___ Complete Exam - Normal    2   ___ Exam normal, but entire colon not seen  We will discuss this with you  3   ___ Polyp(s) removed by "burning" - NO pathology report will follow    4   _1__ Polyp(s) removed by excision  Pathology report will be available in 4-5 days   Someone from our office will call you with results  5   ___ Exam prompted biopsies  Pathology report will be available in 4-5 days   Someone from our office will call you with results  6   ___ Exam demonstrated findings that need treatment  Prescriptions will be   Given to you  Return to our office in ____ weeks  Please call for appt  7   ___ Original office visit or colonoscopy findings necessitate an office visit  Please call to set up a new appointment    8   ___ Medication  __________________________________________        [address]:    - Go straight home and rest today    - No driving or drinking alcohol for 24 hours    - Resume regular diet and medications unless otherwise instructed  Coumadin and Plavix are blood thinners  You can resume these medications on __________      IF YOU ARE HAVING ANY FEVER, BLEEDING OR PERSISTENT PAIN IN THE ABDOMEN, PLEASE CALL OUR OFFICE ANY DAY OR TIME  [phone]

## 2020-08-28 NOTE — INTERVAL H&P NOTE
H&P reviewed  After examining the patient I find no changes in the patients condition since the H&P had been written      Vitals:    08/28/20 0811   BP: 118/78   Pulse: 100   Resp: 20   Temp: (!) 96 6 °F (35 9 °C)   SpO2: 99%

## 2021-07-22 ENCOUNTER — OFFICE VISIT (OUTPATIENT)
Dept: URGENT CARE | Facility: CLINIC | Age: 26
End: 2021-07-22
Payer: COMMERCIAL

## 2021-07-22 VITALS
OXYGEN SATURATION: 100 % | DIASTOLIC BLOOD PRESSURE: 79 MMHG | RESPIRATION RATE: 16 BRPM | TEMPERATURE: 98.7 F | BODY MASS INDEX: 32.79 KG/M2 | SYSTOLIC BLOOD PRESSURE: 117 MMHG | HEART RATE: 70 BPM | WEIGHT: 191 LBS

## 2021-07-22 DIAGNOSIS — H66.92 LEFT OTITIS MEDIA, UNSPECIFIED OTITIS MEDIA TYPE: Primary | ICD-10-CM

## 2021-07-22 PROCEDURE — 99213 OFFICE O/P EST LOW 20 MIN: CPT | Performed by: PHYSICIAN ASSISTANT

## 2021-07-22 RX ORDER — AMOXICILLIN 875 MG/1
875 TABLET, COATED ORAL 2 TIMES DAILY
Qty: 20 TABLET | Refills: 0 | Status: SHIPPED | OUTPATIENT
Start: 2021-07-22 | End: 2021-08-01

## 2021-07-22 NOTE — PATIENT INSTRUCTIONS
Ear Infection   WHAT YOU NEED TO KNOW:   What do I need to know about an ear infection? An ear infection is also called otitis media  Blocked or swollen eustachian tubes can cause an infection  Eustachian tubes connect the middle ear to the back of the nose and throat  They drain fluid from the middle ear  You may have a buildup of fluid in your ear  Germs build up in the fluid and infection develops  What are the signs and symptoms of an ear infection? · Ear pain    · Fever or a headache    · Trouble hearing    · Ringing or buzzing in your ear    · Plugged ear or an ear that feels full    · Dizziness    · Nausea or vomiting    How is an ear infection diagnosed? Your healthcare provider will examine your ears, head, neck, and mouth  He or she will also ask you to describe your symptoms  You may also need the following:  · Audiometry  is a test used to check for hearing loss  Sounds are played at different volumes to check how much you can hear  · Tympanometry  is a test used to check pressure changes in your inner ear  How is an ear infection treated? · Acetaminophen  decreases pain and fever  It is available without a doctor's order  Ask how much to take and how often to take it  Follow directions  Read the labels of all other medicines you are using to see if they also contain acetaminophen, or ask your doctor or pharmacist  Acetaminophen can cause liver damage if not taken correctly  Do not use more than 4 grams (4,000 milligrams) total of acetaminophen in one day  · NSAIDs , such as ibuprofen, help decrease swelling, pain, and fever  This medicine is available with or without a doctor's order  NSAIDs can cause stomach bleeding or kidney problems in certain people  If you take blood thinner medicine, always ask your healthcare provider if NSAIDs are safe for you  Always read the medicine label and follow directions      · Ear drops  may contain medicine to decrease pain and inflammation  · Antibiotics  help treat a bacterial infection  How can I manage my symptoms? · Apply heat  on your ear for 15 to 20 minutes, 3 to 4 times a day or as directed  You can apply heat with an electric heating pad, hot water bottle, or warm compress  Always put a cloth between your skin and the heat pack to prevent burns  Heat helps decrease pain  · Apply ice  on your ear for 15 to 20 minutes, 3 to 4 times a day for 2 days or as directed  Use an ice pack, or put crushed ice in a plastic bag  Cover it with a towel before you apply it to your ear  Ice decreases swelling and pain  How can I help prevent an ear infection? · Wash your hands often  to help prevent the spread of germs  Ask everyone in your house to wash their hands with soap and water  Ask them to wash after they use the bathroom or change a diaper  Remind them to wash before they prepare or eat food  · Stay away from people who are ill  Some germs spread easily and quickly through contact  When should I seek immediate care? · You have clear fluid coming from your ear  · You have a stiff neck, headache, and a fever  When should I call my doctor? · You see blood or pus draining from your ear  · Your ear pain gets worse or does not go away, even after treatment  · The outside of your ear is red or swollen  · You are vomiting or have diarrhea  · You have questions or concerns about your condition or care  CARE AGREEMENT:   You have the right to help plan your care  Learn about your health condition and how it may be treated  Discuss treatment options with your healthcare providers to decide what care you want to receive  You always have the right to refuse treatment  The above information is an  only  It is not intended as medical advice for individual conditions or treatments   Talk to your doctor, nurse or pharmacist before following any medical regimen to see if it is safe and effective for you  © Copyright [employer] 2021 Information is for End User's use only and may not be sold, redistributed or otherwise used for commercial purposes   All illustrations and images included in CareNotes® are the copyrighted property of A D A M , Inc  or Indra Boyer

## 2021-07-22 NOTE — PROGRESS NOTES
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  Both ears feel blocked and affecting hearing         History of Present Illness        Patient presents with a 3 week history of muffled hearing of both ears but left is worse than right  She is also having a lot of nasal congestion, postnasal drip and nasal drainage can be very thickened colored in the morning  Does have an intermittent cough  Denies any fever chills nausea vomiting body aches dizziness  No known exposure to COVID-19, patient is not vaccinated  Review of Systems   Review of Systems   Constitutional: Negative for chills and fever  HENT: Positive for congestion, ear pain, postnasal drip and rhinorrhea  Negative for sore throat  Respiratory: Positive for cough  Gastrointestinal: Positive for diarrhea  Negative for nausea and vomiting  Musculoskeletal: Negative for myalgias  Skin: Negative for rash  Neurological: Negative for light-headedness           Current Medications       Current Outpatient Medications:     amoxicillin (AMOXIL) 875 mg tablet, Take 1 tablet (875 mg total) by mouth 2 (two) times a day for 10 days, Disp: 20 tablet, Rfl: 0    Current Allergies     Allergies as of 2021 - Reviewed 2021   Allergen Reaction Noted    Morphine and related Hives 10/09/2016            The following portions of the patient's history were reviewed and updated as appropriate: allergies, current medications, past family history, past medical history, past social history, past surgical history and problem list      Past Medical History:   Diagnosis Date    Anxiety 3/10/2015    [facility]ome[facility] infection) [facility] )     age 7   [facility] Colon polyp     Drug addiction ([facility] )     Family hx of colon cancer     mother       Past Surgical History:   Procedure Laterality Date    COLONOSCOPY      ELBOW SURGERY Right     TONSILECTOMY AND ADNOIDECTOMY      TONSILLECTOMY      TRICEPS TENDON RELEASE Right        Family History   Problem Relation Age of Onset    Diabetes Mother     Lupus Mother     Rheumatologic disease Mother     Colon cancer Mother     No Known Problems Father     Hypertension Sister     Cancer Neg Hx          Medications have been verified  Objective   /79   Pulse 70   Temp 98 7 °F (37 1 °C)   Resp 16   Wt 86 6 kg (191 lb)   SpO2 100%   BMI 32 79 kg/m²   No LMP recorded  Physical Exam     Physical Exam  Vitals and nursing note reviewed  Constitutional:       Appearance: Normal appearance  HENT:      Head: Normocephalic and atraumatic  Right Ear: Tympanic membrane and ear canal normal       Left Ear: Ear canal normal       Ears:      Comments: Left TM with mild erythema and obscured light reflex  Nose: Nose normal       Mouth/Throat:      Mouth: Mucous membranes are moist       Pharynx: Oropharynx is clear  Eyes:      Conjunctiva/sclera: Conjunctivae normal    Cardiovascular:      Rate and Rhythm: Normal rate and regular rhythm  Heart sounds: Normal heart sounds  Pulmonary:      Effort: Pulmonary effort is normal       Breath sounds: Normal breath sounds  Musculoskeletal:      Cervical back: Neck supple  Lymphadenopathy:      Cervical: No cervical adenopathy  Skin:     General: Skin is warm  Neurological:      Mental Status: She is alert

## 2021-08-14 ENCOUNTER — HOSPITAL ENCOUNTER (EMERGENCY)
Facility: HOSPITAL | Age: 26
Discharge: HOME | End: 2021-08-14
Attending: EMERGENCY MEDICINE
Payer: COMMERCIAL

## 2021-08-14 VITALS
OXYGEN SATURATION: 98 % | WEIGHT: 191.14 LBS | RESPIRATION RATE: 16 BRPM | HEART RATE: 96 BPM | TEMPERATURE: 97.7 F | BODY MASS INDEX: 32.81 KG/M2 | DIASTOLIC BLOOD PRESSURE: 79 MMHG | SYSTOLIC BLOOD PRESSURE: 143 MMHG

## 2021-08-14 DIAGNOSIS — J04.0 LARYNGITIS: Primary | ICD-10-CM

## 2021-08-14 DIAGNOSIS — K21.9 ACID REFLUX: ICD-10-CM

## 2021-08-14 PROCEDURE — 99284 EMERGENCY DEPT VISIT MOD MDM: CPT | Performed by: PHYSICIAN ASSISTANT

## 2021-08-14 PROCEDURE — 99283 EMERGENCY DEPT VISIT LOW MDM: CPT

## 2021-08-14 RX ORDER — FAMOTIDINE 20 MG/1
20 TABLET, FILM COATED ORAL ONCE
Status: COMPLETED | OUTPATIENT
Start: 2021-08-14 | End: 2021-08-14

## 2021-08-14 RX ORDER — FAMOTIDINE 20 MG/1
20 TABLET, FILM COATED ORAL 2 TIMES DAILY
Qty: 30 TABLET | Refills: 0 | Status: SHIPPED | OUTPATIENT
Start: 2021-08-14

## 2021-08-14 RX ADMIN — FAMOTIDINE 20 MG: 20 TABLET ORAL at 13:08

## 2021-08-14 RX ADMIN — DEXAMETHASONE SODIUM PHOSPHATE 10 MG: 10 INJECTION, SOLUTION INTRAMUSCULAR; INTRAVENOUS at 13:08

## 2021-08-14 NOTE — DISCHARGE INSTRUCTIONS
You were given a single dose of steroids to help your throat/voice  Start the pepcid daily to help with your acid reflux  Review dietary modifications  Follow up with your PCP or return to ER as needed  Consider GI referral if having persistent reflux/symptoms

## 2021-08-14 NOTE — ED PROVIDER NOTES
History  Chief Complaint   Patient presents with    Cold Like Symptoms     pt states she has been losing voice and has mucous in throat for three days  pt states OTC medications are not working  31year old female presents ambulatory from home for evaluation of sore throat and loss of voice  Pt reports symptoms started 2-3 days ago  She reports having mucous in the back of her throat  No difficultly swallowing  No facial swelling  Denies fever, chills, runny/stuffy nose, ear ache  Denies cough  Denies chest pain, SOB  Denies N/V, abdominal pain  Reports intermittent diarrhea which she contributes to eating spicy foods  Also reports acid reflux which she reports occurs almost daily  Reports burning into her throat  Denies sick contacts  No known exposure to covid  Has tried mucinex without relief  No reported alleviating factors  History provided by:  Patient   used: No        None       Past Medical History:   Diagnosis Date    Anxiety 3/10/2015    [facility]cyt[facility] infection[facility] )     age 8    Colon polyp     Drug addiction ([facility] )     Family hx of colon cancer     mother       Past Surgical History:   Procedure Laterality Date    COLONOSCOPY      ELBOW SURGERY Right     TONSILECTOMY AND ADNOIDECTOMY      TONSILLECTOMY      TRICEPS TENDON RELEASE Right        Family History   Problem Relation Age of Onset    Diabetes Mother     Lupus Mother     Rheumatologic disease Mother     Colon cancer Mother     No Known Problems Father     Hypertension Sister     Cancer Neg Hx      I have reviewed and agree with the history as documented      E-Cigarette/Vaping    E-Cigarette Use Never User      E-Cigarette/Vaping Substances    Nicotine No     THC No     CBD No     Flavoring No     Other No     Unknown No      Social History     Tobacco Use    Smoking status: Former Smoker     Packs/day: 1 00     Years: 9 00     Pack years: 9 00     Types: Cigarettes Quit date: 2021     Years since quittin 0    Smokeless tobacco: Never Used   Vaping Use    Vaping Use: Never used   Substance Use Topics    Alcohol use: Not Currently    Drug use: No       Review of Systems   Constitutional: Negative  Negative for chills, fatigue and fever  HENT: Positive for postnasal drip, sore throat and voice change  Negative for congestion, ear pain, rhinorrhea and trouble swallowing  Eyes: Negative  Negative for visual disturbance  Respiratory: Negative  Negative for cough, shortness of breath and wheezing  Cardiovascular: Negative  Negative for chest pain, palpitations and leg swelling  Gastrointestinal: Positive for diarrhea  Negative for abdominal pain, constipation, nausea and vomiting  Genitourinary: Negative  Negative for dysuria, flank pain, frequency and hematuria  Musculoskeletal: Negative  Negative for back pain and myalgias  Skin: Negative  Negative for rash  Neurological: Negative  Negative for dizziness, light-headedness and headaches  Psychiatric/Behavioral: Negative  All other systems reviewed and are negative  Physical Exam  Physical Exam  Vitals and nursing note reviewed  Constitutional:       General: She is not in acute distress  Appearance: She is well-developed  She is not toxic-appearing  HENT:      Head: Normocephalic and atraumatic  Right Ear: Hearing, tympanic membrane, ear canal and external ear normal       Left Ear: Hearing, tympanic membrane, ear canal and external ear normal       Nose: Nose normal       Mouth/Throat:      Mouth: Mucous membranes are moist  No oral lesions  Tongue: Tongue does not deviate from midline  Pharynx: Oropharynx is clear  Uvula midline  No pharyngeal swelling, oropharyngeal exudate or posterior oropharyngeal erythema  Tonsils: No tonsillar abscesses  Comments: +hoarseness  Eyes:      General: Lids are normal  No scleral icterus       Extraocular Movements: Extraocular movements intact  Conjunctiva/sclera: Conjunctivae normal       Pupils: Pupils are equal, round, and reactive to light  Neck:      Trachea: Trachea normal  No tracheal deviation  Cardiovascular:      Rate and Rhythm: Normal rate and regular rhythm  Pulses: Normal pulses  Heart sounds: Normal heart sounds, S1 normal and S2 normal  No murmur heard  Pulmonary:      Effort: Pulmonary effort is normal  No tachypnea  Breath sounds: Normal breath sounds  No wheezing, rhonchi or rales  Abdominal:      General: Bowel sounds are normal  There is no distension  Palpations: Abdomen is soft  Tenderness: There is no abdominal tenderness  There is no guarding  Musculoskeletal:      Cervical back: Normal range of motion and neck supple  Right lower leg: No edema  Left lower leg: No edema  Skin:     General: Skin is warm and dry  Capillary Refill: Capillary refill takes less than 2 seconds  Findings: No rash  Neurological:      General: No focal deficit present  Mental Status: She is alert and oriented to person, place, and time  GCS: GCS eye subscore is 4  GCS verbal subscore is 5  GCS motor subscore is 6  Cranial Nerves: No cranial nerve deficit  Sensory: No sensory deficit  Motor: No abnormal muscle tone        Gait: Gait normal    Psychiatric:         Mood and Affect: Mood normal          Speech: Speech normal          Behavior: Behavior normal          Vital Signs  ED Triage Vitals [08/14/21 1232]   Temperature Pulse Respirations Blood Pressure SpO2   97 7 °F (36 5 °C) 96 16 143/79 98 %      Temp Source Heart Rate Source Patient Position - Orthostatic VS BP Location FiO2 (%)   Temporal Monitor Sitting Right arm --      Pain Score       --           Vitals:    08/14/21 1232   BP: 143/79   Pulse: 96   Patient Position - Orthostatic VS: Sitting         Visual Acuity      ED Medications  Medications   dexamethasone oral liquid 10 mg 1 mL (10 mg Oral Given 8/14/21 1308)   famotidine (PEPCID) tablet 20 mg (20 mg Oral Given 8/14/21 1308)       Diagnostic Studies  Results Reviewed     None                 No orders to display              Procedures  Procedures         ED Course  ED Course as of Aug 14 1428   Sat Aug 14, 2021   1300 Pt declined covid testing  Pt afebrile and hemodynamically stable  Suspect symptoms related to viral etiology  Also discussed symptoms could be related to GERD  Will give dose of decadron and also discharge on pepcid  Dietary modifications discussed  Discussed continued symptomatic/supportive care  Advised rest, fluids, OTC meds as needed for symptoms  Strict return precautions outlined  Advised outpatient follow up with PCP or return to ER for change in condition as outlined  Pt verbalized understanding and had no further questions  MDM  Number of Diagnoses or Management Options  Acid reflux: new and does not require workup  Laryngitis: new and does not require workup     Amount and/or Complexity of Data Reviewed  Decide to obtain previous medical records or to obtain history from someone other than the patient: yes  Obtain history from someone other than the patient: yes  Review and summarize past medical records: yes    Patient Progress  Patient progress: improved      Disposition  Final diagnoses:   Laryngitis   Acid reflux     Time reflects when diagnosis was documented in both MDM as applicable and the Disposition within this note     Time User Action Codes Description Comment    8/14/2021  1:04 PM Ric Cardoso Add [J04 0] Laryngitis     8/14/2021  1:04 PM Ric Cardoso Add [K21 9] Acid reflux       ED Disposition     ED Disposition Condition Date/Time Comment    Discharge Stable Sat Aug 14, 2021  1:04 PM Shamar Rodríguez discharge to home/self care              Follow-up Information     Follow up With Specialties Details Why Contact Info Additional Information    Lesli Duran DO Family Medicine Schedule an appointment as soon as possible for a visit   [address] [place] [address] [phone]       [facility] Emergency Medicine  As needed [phone]  [facility] Department, [address], [address]          Discharge Medication List as of 8/14/2021  1:06 PM      START taking these medications    Details   famotidine (PEPCID) 20 mg tablet Take 1 tablet (20 mg total) by mouth 2 (two) times a day, Starting Sat 8/14/2021, Normal           No discharge procedures on file      PDMP Review     None          ED Provider  Electronically Signed by           Massimo Franco PA-C  08/14/21 8177

## 2021-08-17 ENCOUNTER — HOSPITAL ENCOUNTER (EMERGENCY)
Facility: HOSPITAL | Age: 26
Discharge: HOME | End: 2021-08-17
Attending: EMERGENCY MEDICINE | Admitting: EMERGENCY MEDICINE
Payer: COMMERCIAL

## 2021-08-17 VITALS
WEIGHT: 191.14 LBS | SYSTOLIC BLOOD PRESSURE: 136 MMHG | TEMPERATURE: 98.4 F | HEART RATE: 62 BPM | DIASTOLIC BLOOD PRESSURE: 66 MMHG | OXYGEN SATURATION: 99 % | RESPIRATION RATE: 18 BRPM | BODY MASS INDEX: 32.81 KG/M2

## 2021-08-17 DIAGNOSIS — R13.10 DIFFICULTY SWALLOWING: ICD-10-CM

## 2021-08-17 DIAGNOSIS — J02.9 PHARYNGITIS: Primary | ICD-10-CM

## 2021-08-17 PROCEDURE — 99284 EMERGENCY DEPT VISIT MOD MDM: CPT | Performed by: EMERGENCY MEDICINE

## 2021-08-17 PROCEDURE — 99283 EMERGENCY DEPT VISIT LOW MDM: CPT

## 2021-08-17 RX ORDER — AMOXICILLIN 500 MG/1
500 CAPSULE ORAL 3 TIMES DAILY
Qty: 21 CAPSULE | Refills: 0 | Status: SHIPPED | OUTPATIENT
Start: 2021-08-17 | End: 2021-08-24

## 2021-08-17 RX ORDER — AMOXICILLIN 250 MG/1
500 CAPSULE ORAL ONCE
Status: COMPLETED | OUTPATIENT
Start: 2021-08-17 | End: 2021-08-17

## 2021-08-17 RX ADMIN — NYSTATIN 500000 UNITS: 100000 SUSPENSION ORAL at 02:38

## 2021-08-17 RX ADMIN — AMOXICILLIN 500 MG: 250 CAPSULE ORAL at 02:37

## 2021-08-17 RX ADMIN — DEXAMETHASONE SODIUM PHOSPHATE 10 MG: 10 INJECTION, SOLUTION INTRAMUSCULAR; INTRAVENOUS at 02:37

## 2021-08-17 NOTE — ED PROVIDER NOTES
History  Chief Complaint   Patient presents with    Medical Problem     difficulty swallowing , seen here on Friday  31year-old painful swallowing  Patient was seen 3 days ago emergency department and diagnosed with tonsillitis and pharyngitis was given Decadron  Was informed symptoms also could be a sequelae of gastroesophageal reflux disease was started on Pepcid therapy  Patient has a history of tonsillectomy and adenectomy as a child, approximately 7years old  Quit smoking approximately 40 days ago  No new food exposures  No recent travel  No change in her smell or taste  Patient states when she swallows food or liquids feels scratchy down her throat  No vomiting  Prior to Admission Medications   Prescriptions Last Dose Informant Patient Reported? Taking?   famotidine (PEPCID) 20 mg tablet   No No   Sig: Take 1 tablet (20 mg total) by mouth 2 (two) times a day      Facility-Administered Medications: None       Past Medical History:   Diagnosis Date    Anxiety 3/10/2015    [facility] )     age 7   Nba Ame Colon polyp     [facility] addiction ([facility] )     Family hx of colon cancer     mother       Past Surgical History:   Procedure Laterality Date    COLONOSCOPY      ELBOW SURGERY Right     TONSILECTOMY AND ADNOIDECTOMY      TONSILLECTOMY      TRICEPS TENDON RELEASE Right        Family History   Problem Relation Age of Onset    Diabetes Mother     Lupus Mother     Rheumatologic disease Mother     Colon cancer Mother     No Known Problems Father     Hypertension Sister     Cancer Neg Hx      I have reviewed and agree with the history as documented      E-Cigarette/Vaping    E-Cigarette Use Never User      E-Cigarette/Vaping Substances    Nicotine No     THC No     CBD No     Flavoring No     Other No     Unknown No      Social History     Tobacco Use    Smoking status: Former Smoker     Packs/day: 1 00     Years: 9 00     Pack years: 9 00     Types: Cigarettes     Quit date: 2021     Years since quittin 0    Smokeless tobacco: Never Used   Vaping Use    Vaping Use: Never used   Substance Use Topics    Alcohol use: Not Currently    Drug use: No       Review of Systems   Constitutional: Negative for appetite change, chills, fatigue, fever and unexpected weight change  HENT: Positive for trouble swallowing  Negative for congestion, ear pain, rhinorrhea and sore throat  Eyes: Negative for pain and visual disturbance  Respiratory: Negative for cough, chest tightness, shortness of breath and wheezing  Cardiovascular: Negative for chest pain, palpitations and leg swelling  Gastrointestinal: Negative for abdominal pain, constipation, diarrhea, nausea and vomiting  Genitourinary: Negative for difficulty urinating, dysuria, frequency, hematuria, menstrual problem, pelvic pain, vaginal bleeding and vaginal discharge  Musculoskeletal: Negative for arthralgias, back pain and neck pain  Skin: Negative for color change and rash  Neurological: Negative for dizziness, seizures, syncope, light-headedness and headaches  Psychiatric/Behavioral: Negative for sleep disturbance  The patient is not nervous/anxious  All other systems reviewed and are negative  Physical Exam  Physical Exam  Vitals and nursing note reviewed  Constitutional:       General: She is not in acute distress  Appearance: Normal appearance  She is well-developed  She is not ill-appearing, toxic-appearing or diaphoretic  HENT:      Head: Normocephalic and atraumatic  Nose: Nose normal  No rhinorrhea  Mouth/Throat:      Mouth: Mucous membranes are moist       Pharynx: Posterior oropharyngeal erythema present  No oropharyngeal exudate  Eyes:      General: No scleral icterus  Extraocular Movements: Extraocular movements intact  Conjunctiva/sclera: Conjunctivae normal    Cardiovascular:      Rate and Rhythm: Normal rate and regular rhythm  Pulses: Normal pulses  Heart sounds: Normal heart sounds  No murmur heard  No friction rub  No gallop  Pulmonary:      Effort: Pulmonary effort is normal  No respiratory distress  Breath sounds: Normal breath sounds  No wheezing or rales  Abdominal:      Palpations: Abdomen is soft  There is no mass  Tenderness: There is no abdominal tenderness  There is no right CVA tenderness, left CVA tenderness, guarding or rebound  Hernia: No hernia is present  Musculoskeletal:         General: No swelling, tenderness, deformity or signs of injury  Normal range of motion  Cervical back: Normal range of motion and neck supple  Right lower leg: No edema  Left lower leg: No edema  Skin:     General: Skin is warm and dry  Capillary Refill: Capillary refill takes less than 2 seconds  Coloration: Skin is not jaundiced or pale  Findings: No bruising, erythema, lesion or rash  Neurological:      General: No focal deficit present  Mental Status: She is alert and oriented to person, place, and time     Psychiatric:         Mood and Affect: Mood normal          Behavior: Behavior normal          Vital Signs  ED Triage Vitals [08/17/21 0137]   Temperature Pulse Respirations Blood Pressure SpO2   98 4 °F (36 9 °C) 62 18 136/66 100 %      Temp Source Heart Rate Source Patient Position - Orthostatic VS BP Location FiO2 (%)   Temporal Monitor Sitting Left arm --      Pain Score       8           Vitals:    08/17/21 0137   BP: 136/66   Pulse: 62   Patient Position - Orthostatic VS: Sitting         Visual Acuity      ED Medications  Medications   dexamethasone oral liquid 10 mg 1 mL (has no administration in time range)   amoxicillin (AMOXIL) capsule 500 mg (has no administration in time range)   nystatin (MYCOSTATIN) oral suspension 500,000 Units (has no administration in time range)       Diagnostic Studies  Results Reviewed     None                 No orders to display Procedures  Procedures         ED Course                                           MDM    Disposition  Final diagnoses:   Pharyngitis   Difficulty swallowing     Time reflects when diagnosis was documented in both MDM as applicable and the Disposition within this note     Time User Action Codes Description Comment    8/17/2021  2:11 AM Margie Adan [J02 9] Pharyngitis     8/17/2021  2:11 AM Margie ROCA Add [R13 10] Difficulty swallowing       ED Disposition     ED Disposition Condition Date/Time Comment    Discharge Stable Tue Aug 17, 2021  2:11 AM Avinash Espinosa discharge to home/self care  Follow-up Information     Follow up With Specialties Details Why Contact Info    Magdalena Michaels DO Family Medicine Schedule an appointment as soon as possible for a visit   [address] [place] [address], [place] [id number] [phone]            Patient's Medications   Discharge Prescriptions    AMOXICILLIN (AMOXIL) 500 MG CAPSULE    Take 1 capsule (500 mg total) by mouth 3 (three) times a day for 7 days       Start Date: 8/17/2021 End Date: 8/24/2021       Order Dose: 500 mg       Quantity: 21 capsule    Refills: 0    NYSTATIN (MYCOSTATIN) 500,000 UNITS/5 ML SUSPENSION    Take 5 mL (500,000 Units total) by mouth 4 (four) times a day       Start Date: 8/17/2021 End Date: --       Order Dose: 500,000 Units       Quantity: 60 mL    Refills: 0     No discharge procedures on file      PDMP Review     None          ED Provider  Electronically Signed by           Camryn Lopez DO  08/17/21 7107

## 2021-09-17 ENCOUNTER — OFFICE VISIT (OUTPATIENT)
Dept: URGENT CARE | Facility: CLINIC | Age: 26
End: 2021-09-17
Payer: COMMERCIAL

## 2021-09-17 VITALS
BODY MASS INDEX: 32.61 KG/M2 | HEART RATE: 100 BPM | WEIGHT: 190 LBS | TEMPERATURE: 98.9 F | OXYGEN SATURATION: 97 % | RESPIRATION RATE: 22 BRPM

## 2021-09-17 DIAGNOSIS — R53.83 OTHER FATIGUE: ICD-10-CM

## 2021-09-17 DIAGNOSIS — Z11.59 SCREENING FOR VIRAL DISEASE: ICD-10-CM

## 2021-09-17 DIAGNOSIS — R06.02 SOB (SHORTNESS OF BREATH): Primary | ICD-10-CM

## 2021-09-17 PROCEDURE — 99213 OFFICE O/P EST LOW 20 MIN: CPT | Performed by: PHYSICIAN ASSISTANT

## 2021-09-17 PROCEDURE — U0003 INFECTIOUS AGENT DETECTION BY NUCLEIC ACID (DNA OR RNA); SEVERE ACUTE RESPIRATORY SYNDROME CORONAVIRUS 2 (SARS-COV-2) (CORONAVIRUS DISEASE [COVID-19]), AMPLIFIED PROBE TECHNIQUE, MAKING USE OF HIGH THROUGHPUT TECHNOLOGIES AS DESCRIBED BY CMS-2020-01-R: HCPCS | Performed by: PHYSICIAN ASSISTANT

## 2021-09-17 PROCEDURE — U0005 INFEC AGEN DETEC AMPLI PROBE: HCPCS | Performed by: PHYSICIAN ASSISTANT

## 2021-09-17 RX ORDER — FEXOFENADINE HCL 180 MG/1
180 TABLET ORAL DAILY
COMMUNITY
Start: 2021-08-24

## 2021-09-17 NOTE — PATIENT INSTRUCTIONS

## 2021-09-17 NOTE — PROGRESS NOTES
[facility]        NAME: Bo Falcon is a 31 y o  female  : 1995    MRN: [id number]  DATE: 2021  TIME: 2:00 PM    Assessment and Plan   SOB (shortness of breath) [R06 02]  1  SOB (shortness of breath)  [facility]   2  Other fatigue  Novel Coronavirus (Covid-19),PCR [facility] - Office Collection   3  Screening for viral disease  Novel Coronavirus (Covid-19),PCR [facility] - Office Collection         Patient Instructions       Follow up with PCP in 3-5 days  Proceed to  ER if symptoms worsen  Chief Complaint     Chief Complaint   Patient presents with    Sore Throat     sneezing , SOB, blocked ears x 3 days          History of Present Illness       Patient presents with a 3 day history of congestion, sneezing, sore throat, fatigue, nausea and headaches  Today she started with slight shortness of breath  Denies any cough  No known exposure to COVID-19 and she is not vaccinated  Review of Systems   Review of Systems   Constitutional: Positive for chills and fatigue  Negative for fever  HENT: Positive for congestion and sore throat  Negative for ear pain and rhinorrhea  Respiratory: Positive for shortness of breath  Negative for cough  Gastrointestinal: Positive for nausea  Negative for diarrhea and vomiting  Musculoskeletal: Negative for myalgias  Skin: Negative for rash  Neurological: Positive for headaches  Negative for dizziness           Current Medications       Current Outpatient Medications:     famotidine (PEPCID) 20 mg tablet, Take 1 tablet (20 mg total) by mouth 2 (two) times a day, Disp: 30 tablet, Rfl: 0    fexofenadine (ALLEGRA) 180 MG tablet, Take 180 mg by mouth daily, Disp: , Rfl:     nystatin (MYCOSTATIN) 500,000 units/5 mL suspension, Take 5 mL (500,000 Units total) by mouth 4 (four) times a day (Patient not taking: Reported on 2021), Disp: 60 mL, Rfl: 0    Current Allergies     Allergies as of 09/17/2021 - Reviewed 09/17/2021   Allergen Reaction Noted    Morphine and related Hives 10/09/2016            The following portions of the patient's history were reviewed and updated as appropriate: allergies, current medications, past family history, past medical history, past social history, past surgical history and problem list      Past Medical History:   Diagnosis Date    Anxiety 3/10/2015    CMV (cytomegalovirus infection) ([facility] )     age 7   Yuko [place] Colon polyp     [facility]     Family hx of colon cancer     mother       Past Surgical History:   Procedure Laterality Date    COLONOSCOPY      ELBOW SURGERY Right     TONSILECTOMY AND ADNOIDECTOMY      TONSILLECTOMY      TRICEPS TENDON RELEASE Right        Family History   Problem Relation Age of Onset    Diabetes Mother     Lupus Mother     Rheumatologic disease Mother     Colon cancer Mother     No Known Problems Father     Hypertension Sister     Cancer Neg Hx          Medications have been verified  Objective   Pulse 100   Temp 98 9 °F (37 2 °C)   Resp 22   Wt 86 2 kg (190 lb)   SpO2 97%   BMI 32 61 kg/m²   No LMP recorded  Physical Exam     Physical Exam  Vitals and nursing note reviewed  Constitutional:       Appearance: She is well-developed  HENT:      Head: Normocephalic and atraumatic  Right Ear: Tympanic membrane and ear canal normal       Left Ear: Tympanic membrane and ear canal normal       Mouth/Throat:      Mouth: Mucous membranes are moist       Pharynx: Oropharynx is clear  Eyes:      Conjunctiva/sclera: Conjunctivae normal    Cardiovascular:      Rate and Rhythm: Normal rate and regular rhythm  Heart sounds: Normal heart sounds  Pulmonary:      Effort: Pulmonary effort is normal       Breath sounds: Normal breath sounds  Skin:     General: Skin is warm  Neurological:      Mental Status: She is alert

## 2021-09-18 LAB — SARS-COV-2 RNA RESP QL NAA+PROBE: NEGATIVE

## 2021-10-26 ENCOUNTER — HOSPITAL ENCOUNTER (EMERGENCY)
Facility: HOSPITAL | Age: 26
Discharge: HOME | End: 2021-10-26
Attending: EMERGENCY MEDICINE
Payer: COMMERCIAL

## 2021-10-26 VITALS
RESPIRATION RATE: 20 BRPM | HEART RATE: 124 BPM | HEIGHT: 64 IN | OXYGEN SATURATION: 97 % | WEIGHT: 190 LBS | DIASTOLIC BLOOD PRESSURE: 86 MMHG | BODY MASS INDEX: 32.44 KG/M2 | SYSTOLIC BLOOD PRESSURE: 141 MMHG | TEMPERATURE: 100.6 F

## 2021-10-26 DIAGNOSIS — R05.9 COUGH: ICD-10-CM

## 2021-10-26 DIAGNOSIS — Z20.822 EXPOSURE TO CONFIRMED CASE OF COVID-19: ICD-10-CM

## 2021-10-26 DIAGNOSIS — R50.9 FEVER: ICD-10-CM

## 2021-10-26 DIAGNOSIS — R07.89 FEELING OF CHEST TIGHTNESS: ICD-10-CM

## 2021-10-26 DIAGNOSIS — Z20.822 SUSPECTED COVID-19 VIRUS INFECTION: Primary | ICD-10-CM

## 2021-10-26 LAB — SARS-COV-2 RNA RESP QL NAA+PROBE: POSITIVE

## 2021-10-26 PROCEDURE — U0003 INFECTIOUS AGENT DETECTION BY NUCLEIC ACID (DNA OR RNA); SEVERE ACUTE RESPIRATORY SYNDROME CORONAVIRUS 2 (SARS-COV-2) (CORONAVIRUS DISEASE [COVID-19]), AMPLIFIED PROBE TECHNIQUE, MAKING USE OF HIGH THROUGHPUT TECHNOLOGIES AS DESCRIBED BY CMS-2020-01-R: HCPCS | Performed by: EMERGENCY MEDICINE

## 2021-10-26 PROCEDURE — 99284 EMERGENCY DEPT VISIT MOD MDM: CPT | Performed by: EMERGENCY MEDICINE

## 2021-10-26 PROCEDURE — 99283 EMERGENCY DEPT VISIT LOW MDM: CPT

## 2021-10-26 PROCEDURE — U0005 INFEC AGEN DETEC AMPLI PROBE: HCPCS | Performed by: EMERGENCY MEDICINE

## 2021-10-26 RX ORDER — SENNOSIDES 8.6 MG
650 CAPSULE ORAL EVERY 8 HOURS PRN
Qty: 30 TABLET | Refills: 0 | Status: SHIPPED | OUTPATIENT
Start: 2021-10-26

## 2021-10-26 RX ORDER — IBUPROFEN 600 MG/1
600 TABLET ORAL ONCE
Status: COMPLETED | OUTPATIENT
Start: 2021-10-26 | End: 2021-10-26

## 2021-10-26 RX ORDER — BENZONATATE 100 MG/1
200 CAPSULE ORAL ONCE
Status: COMPLETED | OUTPATIENT
Start: 2021-10-26 | End: 2021-10-26

## 2021-10-26 RX ORDER — IBUPROFEN 800 MG/1
800 TABLET ORAL EVERY 8 HOURS PRN
Qty: 30 TABLET | Refills: 0 | Status: SHIPPED | OUTPATIENT
Start: 2021-10-26 | End: 2021-11-05

## 2021-10-26 RX ORDER — ACETAMINOPHEN 325 MG/1
650 TABLET ORAL ONCE
Status: COMPLETED | OUTPATIENT
Start: 2021-10-26 | End: 2021-10-26

## 2021-10-26 RX ORDER — GUAIFENESIN 600 MG
600 TABLET, EXTENDED RELEASE 12 HR ORAL EVERY 12 HOURS SCHEDULED
Qty: 10 TABLET | Refills: 0 | Status: SHIPPED | OUTPATIENT
Start: 2021-10-26 | End: 2021-10-31

## 2021-10-26 RX ORDER — BENZONATATE 100 MG/1
100 CAPSULE ORAL EVERY 8 HOURS PRN
Qty: 15 CAPSULE | Refills: 0 | Status: SHIPPED | OUTPATIENT
Start: 2021-10-26 | End: 2021-10-31

## 2021-10-26 RX ADMIN — BENZONATATE 200 MG: 100 CAPSULE ORAL at 04:54

## 2021-10-26 RX ADMIN — IBUPROFEN 600 MG: 600 TABLET ORAL at 04:54

## 2021-10-26 RX ADMIN — ACETAMINOPHEN 650 MG: 325 TABLET, FILM COATED ORAL at 04:54

## 2022-01-24 ENCOUNTER — NURSE TRIAGE (OUTPATIENT)
Dept: OTHER | Facility: OTHER | Age: 27
End: 2022-01-24

## 2022-01-24 DIAGNOSIS — Z20.828 SARS-ASSOCIATED CORONAVIRUS EXPOSURE: Primary | ICD-10-CM

## 2022-01-25 PROCEDURE — U0003 INFECTIOUS AGENT DETECTION BY NUCLEIC ACID (DNA OR RNA); SEVERE ACUTE RESPIRATORY SYNDROME CORONAVIRUS 2 (SARS-COV-2) (CORONAVIRUS DISEASE [COVID-19]), AMPLIFIED PROBE TECHNIQUE, MAKING USE OF HIGH THROUGHPUT TECHNOLOGIES AS DESCRIBED BY CMS-2020-01-R: HCPCS | Performed by: FAMILY MEDICINE

## 2022-01-25 PROCEDURE — U0005 INFEC AGEN DETEC AMPLI PROBE: HCPCS | Performed by: FAMILY MEDICINE

## 2022-01-25 NOTE — TELEPHONE ENCOUNTER
Reason for Disposition   [1] COVID-19 infection suspected by caller or triager AND [2] mild symptoms (cough, fever, or others) AND [3] has not gotten tested yet    Answer Assessment - Initial Assessment Questions  Were you within 6 feet or less, for up to 15 minutes or more with a person that has a confirmed COVID-19 test?   Yes    What was the date of your exposure? Mother tested positive    Are you experiencing any symptoms attributed to the virus?  (Assess for SOB, cough, fever, difficulty breathing)   Yes, sore throat, fever, congestion, diarrhea, body aches, cough and sneezing    HIGH RISK: Do you have any history heart or lung conditions, weakened immune system, diabetes, Asthma, CHF, HIV, COPD, Chemo, renal failure, sickle cell, etc?   Denies    PREGNANCY: Are you pregnant or did you recently give birth?    Denies    VACCINE: "Have you gotten the COVID-19 vaccine?" If Yes ask: "Which one, how many shots, when did you get it?"   denies    Protocols used: CORONAVIRUS (COVID-19) [facility]NOSED [facility]USPECT[facility]

## 2022-01-25 NOTE — TELEPHONE ENCOUNTER
Regarding: Covid/ Symptomatic/ fever 101  ----- Message from Ame Philip, [address] [place] [address] Elva Scott sent at 1/24/2022  8:15 PM EST -----  "I have a sore throat, fever, and congestion "

## 2022-02-04 ENCOUNTER — APPOINTMENT (EMERGENCY)
Dept: CT IMAGING | Facility: HOSPITAL | Age: 27
End: 2022-02-04
Payer: COMMERCIAL

## 2022-02-04 ENCOUNTER — HOSPITAL ENCOUNTER (EMERGENCY)
Facility: HOSPITAL | Age: 27
Discharge: HOME | End: 2022-02-04
Attending: EMERGENCY MEDICINE
Payer: COMMERCIAL

## 2022-02-04 ENCOUNTER — APPOINTMENT (EMERGENCY)
Dept: RADIOLOGY | Facility: HOSPITAL | Age: 27
End: 2022-02-04
Payer: COMMERCIAL

## 2022-02-04 VITALS
TEMPERATURE: 98.2 F | DIASTOLIC BLOOD PRESSURE: 72 MMHG | OXYGEN SATURATION: 99 % | BODY MASS INDEX: 31.76 KG/M2 | SYSTOLIC BLOOD PRESSURE: 132 MMHG | HEART RATE: 100 BPM | RESPIRATION RATE: 18 BRPM | WEIGHT: 185 LBS
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
## 2022-02-04 DIAGNOSIS — S16.1XXA STRAIN OF NECK MUSCLE, INITIAL ENCOUNTER: ICD-10-CM

## 2022-02-04 DIAGNOSIS — S09.90XA CLOSED HEAD INJURY, INITIAL ENCOUNTER: ICD-10-CM

## 2022-02-04 DIAGNOSIS — J32.3 SPHENOID SINUSITIS: ICD-10-CM

## 2022-02-04 PROCEDURE — 71045 X-RAY EXAM CHEST 1 VIEW: CPT

## 2022-02-04 PROCEDURE — 99285 EMERGENCY DEPT VISIT HI MDM: CPT

## 2022-02-04 PROCEDURE — 99284 EMERGENCY DEPT VISIT MOD MDM: CPT | Performed by: PHYSICIAN ASSISTANT

## 2022-02-04 PROCEDURE — 70450 CT HEAD/BRAIN W/O DYE: CPT

## 2022-02-04 PROCEDURE — G1004 CDSM NDSC: HCPCS

## 2022-02-04 PROCEDURE — 72125 CT NECK SPINE W/O DYE: CPT

## 2022-02-04 RX ORDER — ACETAMINOPHEN 325 MG/1
650 TABLET ORAL ONCE
Status: COMPLETED | OUTPATIENT
Start: 2022-02-04 | End: 2022-02-04

## 2022-02-04 RX ORDER — DEXTROAMPHETAMINE SACCHARATE, AMPHETAMINE ASPARTATE MONOHYDRATE, DEXTROAMPHETAMINE SULFATE AND AMPHETAMINE SULFATE 2.5; 2.5; 2.5; 2.5 MG/1; MG/1; MG/1; MG/1
10 CAPSULE, EXTENDED RELEASE ORAL EVERY MORNING
COMMUNITY
Start: 2022-01-07

## 2022-02-04 RX ORDER — FLUOXETINE 10 MG/1
10 CAPSULE ORAL DAILY
COMMUNITY
Start: 2022-01-07

## 2022-02-04 RX ORDER — AMOXICILLIN 500 MG/1
500 CAPSULE ORAL 3 TIMES DAILY
COMMUNITY
Start: 2021-11-05

## 2022-02-04 RX ORDER — METHOCARBAMOL 500 MG/1
500 TABLET, FILM COATED ORAL 2 TIMES DAILY PRN
Qty: 20 TABLET | Refills: 0 | Status: SHIPPED | OUTPATIENT
Start: 2022-02-04

## 2022-02-04 RX ADMIN — ACETAMINOPHEN 650 MG: 325 TABLET, FILM COATED ORAL at 16:14

## 2022-02-04 NOTE — DISCHARGE INSTRUCTIONS
Rest, plenty of fluids  Alternate ice/heat, topical muscle rubs, etc   OTC tylenol and ibuprofen as needed for pain relief  Caution sedation with muscle relaxant  No driving while taking  There was some incidental findings regarding your sphenoid sinuses which I would recommend outpatient follow up with ENT  Follow up with PCP and ENT or return to ER as needed
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
## 2022-12-26 ENCOUNTER — HOSPITAL ENCOUNTER (EMERGENCY)
Facility: HOSPITAL | Age: 27
Discharge: HOME | End: 2022-12-26
Attending: EMERGENCY MEDICINE

## 2022-12-26 VITALS
HEART RATE: 70 BPM | TEMPERATURE: 97 F | RESPIRATION RATE: 18 BRPM | SYSTOLIC BLOOD PRESSURE: 122 MMHG | DIASTOLIC BLOOD PRESSURE: 73 MMHG | OXYGEN SATURATION: 100 %

## 2022-12-26 DIAGNOSIS — J06.9 URI (UPPER RESPIRATORY INFECTION): ICD-10-CM

## 2022-12-26 DIAGNOSIS — J02.9 SORE THROAT: Primary | ICD-10-CM

## 2022-12-26 DIAGNOSIS — H92.02 LEFT EAR PAIN: ICD-10-CM

## 2022-12-26 LAB
FLUAV RNA RESP QL NAA+PROBE: NEGATIVE
FLUBV RNA RESP QL NAA+PROBE: NEGATIVE
RSV RNA RESP QL NAA+PROBE: NEGATIVE
S PYO DNA THROAT QL NAA+PROBE: NOT DETECTED
SARS-COV-2 RNA RESP QL NAA+PROBE: NEGATIVE

## 2022-12-26 RX ADMIN — DEXAMETHASONE SODIUM PHOSPHATE 10 MG: 10 INJECTION, SOLUTION INTRAMUSCULAR; INTRAVENOUS at 02:30

## 2022-12-26 NOTE — DISCHARGE INSTRUCTIONS
Please follow all return precautions  Follow up on testing for covid/flu/RSV  Follow up on strep testing, if positive please call into the ED or call PCP for prescription for antibiotics  Thank you

## 2022-12-26 NOTE — ED PROVIDER NOTES
History  Chief Complaint   Patient presents with   • Medical Problem     States sore throat, congestion since Thanksgiving  Only doing video visits with family doctor and trying to find new doctor  No fevers     79-year-old female presents for sore throat, left ear pain  Patient reports that he has had congestion, cough, that has been going on and off since Thanksgiving or so  She describes that over the past several days, she has noticed worsening left ear pain, as well as a sore throat  Patient states that the symptoms are new  Denies any chest pain or shortness of breath  No nausea or vomiting or diarrhea  Mild congestion currently  No fevers or chills  States that over the past month, several family members have been sick with URIs, although she is unsure what they were diagnosed with  ROS otherwise negative  Prior to Admission Medications   Prescriptions Last Dose Informant Patient Reported? Taking?    FLUoxetine (PROzac) 10 mg capsule   Yes No   Sig: Take 10 mg by mouth daily   acetaminophen (TYLENOL) 650 mg CR tablet   No No   Sig: Take 1 tablet (650 mg total) by mouth every 8 (eight) hours as needed for mild pain   amoxicillin (AMOXIL) 500 mg capsule   Yes No   Sig: Take 500 mg by mouth 3 (three) times a day   amphetamine-dextroamphetamine (ADDERALL XR) 10 MG 24 hr capsule   Yes No   Sig: Take 10 mg by mouth every morning   famotidine (PEPCID) 20 mg tablet   No No   Sig: Take 1 tablet (20 mg total) by mouth 2 (two) times a day   fexofenadine (ALLEGRA) 180 MG tablet   Yes No   Sig: Take 180 mg by mouth daily   ibuprofen (MOTRIN) 800 mg tablet   No No   Sig: Take 1 tablet (800 mg total) by mouth every 8 (eight) hours as needed for mild pain, moderate pain or fever for up to 10 days   methocarbamol (ROBAXIN) 500 mg tablet   No No   Sig: Take 1 tablet (500 mg total) by mouth 2 (two) times a day as needed for muscle spasms   nystatin (MYCOSTATIN) 500,000 units/5 mL suspension   No No   Sig: Take 5 mL (500,000 Units total) by mouth 4 (four) times a day   Patient not taking: Reported on 2021      Facility-Administered Medications: None       Past Medical History:   Diagnosis Date   • Anxiety 3/10/2015   • CMV (cytomegalovirus infection) ([facility] [address] )     age 7   • Colon polyp    • Drug addiction ([facility] [address] )    • Family hx of colon cancer     mother       Past Surgical History:   Procedure Laterality Date   • COLONOSCOPY     • ELBOW SURGERY Right    • TONSILECTOMY AND ADNOIDECTOMY     • TONSILLECTOMY     • TRICEPS TENDON RELEASE Right        Family History   Problem Relation Age of Onset   • Diabetes Mother    • Lupus Mother    • Rheumatologic disease Mother    • Colon cancer Mother    • No Known Problems Father    • Hypertension Sister    • Cancer Neg Hx      I have reviewed and agree with the history as documented  E-Cigarette/Vaping   • E-Cigarette Use Never User      E-Cigarette/Vaping Substances   • Nicotine No    • THC No    • CBD No    • Flavoring No    • Other No    • Unknown No      Social History     Tobacco Use   • Smoking status: Former     Packs/day: 1 00     Years: 9      Pack years:      Types: Cigarettes     Quit date: 2021     Years since quittin 4   • Smokeless tobacco: Never   Vaping Use   • Vaping Use: Never used   Substance Use Topics   • Alcohol use: Not Currently   • Drug use: No       Review of Systems   Constitutional: Negative for chills and fever  HENT: Positive for congestion, ear pain and sore throat  Negative for rhinorrhea  Respiratory: Positive for cough  Negative for shortness of breath  Cardiovascular: Negative for chest pain and palpitations  Gastrointestinal: Negative for abdominal pain, constipation, diarrhea, nausea and vomiting  Genitourinary: Negative for difficulty urinating and flank pain  Musculoskeletal: Negative for arthralgias  Neurological: Negative for dizziness, weakness, light-headedness and headaches     Psychiatric/Behavioral: Negative for agitation, behavioral problems and confusion  All other systems reviewed and are negative  Physical Exam  Physical Exam  Constitutional:       Appearance: She is well-developed  Comments: Patient is well-appearing  HENT:      Head: Normocephalic and atraumatic  Right Ear: Tympanic membrane normal       Left Ear: Tympanic membrane normal       Ears:      Comments: Valerio TMs bilaterally with normal light reflex  Nose: Congestion present  Mouth/Throat:      Mouth: Mucous membranes are moist       Pharynx: No oropharyngeal exudate or posterior oropharyngeal erythema  Comments: No tonsillar enlargement/erythema  No evidence of PTA or RPA  Neck:      Comments: Neck is supple  Cardiovascular:      Rate and Rhythm: Normal rate and regular rhythm  Heart sounds: Normal heart sounds  No murmur heard  No friction rub  Pulmonary:      Effort: Pulmonary effort is normal  No respiratory distress  Breath sounds: Normal breath sounds  No wheezing or rales  Comments: Clear breath sounds BL  Abdominal:      General: Bowel sounds are normal  There is no distension  Palpations: Abdomen is soft  Tenderness: There is no abdominal tenderness  Musculoskeletal:         General: Normal range of motion  Cervical back: Normal range of motion and neck supple  Skin:     General: Skin is warm  Neurological:      Mental Status: She is alert and oriented to person, place, and time  Coordination: Coordination normal    Psychiatric:         Behavior: Behavior normal          Thought Content:  Thought content normal          Judgment: Judgment normal          Vital Signs  ED Triage Vitals   Temperature Pulse Respirations Blood Pressure SpO2   12/26/22 0156 12/26/22 0156 12/26/22 0156 12/26/22 0157 12/26/22 0156   (!) 97 °F (36 1 °C) 70 18 122/73 100 %      Temp Source Heart Rate Source Patient Position - Orthostatic VS BP Location FiO2 (%)   12/26/22 0156 12/26/22 0156 12/26/22 0156 12/26/22 0156 --   Temporal Monitor Sitting Right arm       Pain Score       --                  Vitals:    12/26/22 0156 12/26/22 0157   BP:  122/73   Pulse: 70    Patient Position - Orthostatic VS: Sitting          Visual Acuity      ED Medications  Medications   dexamethasone oral liquid 10 mg 1 mL (10 mg Oral Given 12/26/22 0230)       Diagnostic Studies  Results Reviewed     Procedure Component Value Units Date/Time    FLU/RSV/COVID - if FLU/RSV clinically relevant [[id number]]  (Normal) Collected: 12/26/22 0159    Lab Status: Final result Specimen: Nares from Nasopharyngeal Swab Updated: 12/26/22 0250     SARS-CoV-2 Negative     INFLUENZA A PCR Negative     INFLUENZA B PCR Negative     RSV PCR Negative    Narrative:      FOR PEDIATRIC PATIENTS - copy/paste COVID Guidelines URL to browser: [email or web address]    SARS-CoV-2 assay is a Nucleic Acid Amplification assay intended for the  qualitative detection of nucleic acid from SARS-CoV-2 in nasopharyngeal  swabs  Results are for the presumptive identification of SARS-CoV-2 RNA  Positive results are indicative of infection with SARS-CoV-2, the virus  causing COVID-19, but do not rule out bacterial infection or co-infection  with other viruses  Laboratories within the [place] and its  territories are required to report all positive results to the appropriate  public health authorities  Negative results do not preclude SARS-CoV-2  infection and should not be used as the sole basis for treatment or other  patient management decisions  Negative results must be combined with  clinical observations, patient history, and epidemiological information  This test has not been FDA cleared or approved  This test has been authorized by FDA under an Emergency Use Authorization  (EUA)   This test is only authorized for the duration of time the  declaration that circumstances exist justifying the authorization of the  emergency use of an in vitro diagnostic tests for detection of SARS-CoV-2  virus and/or diagnosis of COVID-19 infection under section 564(b)(1) of  the Act, 21 U  S C  234JTN-8(T)(3), unless the authorization is terminated  or revoked sooner  The test has been validated but independent review by FDA  and CLIA is pending  Test performed using [employer]: This RT-PCR assay targets N2,  a region unique to SARS-CoV-2  A conserved region in the E-gene was chosen  for pan-Sarbecovirus detection which includes SARS-CoV-2  According to CMS-2020-01-R, this platform meets the definition of high-throughput technology  Strep A PCR [[id number]]  (Normal) Collected: 12/26/22 0159    Lab Status: Final result Specimen: Throat Updated: 12/26/22 0250     STREP A PCR Not Detected                 No orders to display              Procedures  Procedures         ED Course                                             MDM  Number of Diagnoses or Management Options  Left ear pain  Sore throat  URI (upper respiratory infection)  Diagnosis management comments: Suspect viral URI  Patient's exam does not demonstrate a bacterial source at this time  Given patient's concern of a sore throat, will obtain strep testing as well  We will discharge the patient with strict return precautions, recommend follow-up on results in case she requires antibiotics  Patient is agreeable with this plan and comfortable with plan  Will discharge        Disposition  Final diagnoses:   Sore throat   URI (upper respiratory infection)   Left ear pain     Time reflects when diagnosis was documented in both MDM as applicable and the Disposition within this note     Time User Action Codes Description Comment    12/26/2022  2:19 AM Alana Champagne Add [J02 9] Sore throat     12/26/2022  2:19 AM Dilan Shelley Add [J06 9] URI (upper respiratory infection)     12/26/2022  2:19 AM Daphney [address] [place] [address] [H92 02] Left ear pain       ED Disposition     ED Disposition   Discharge    Condition   Stable    Date/Time   Mon Dec 26, 2022  2:19 AM    Comment   Percell Aver discharge to home/self care  Follow-up Information     Follow up With Specialties Details Why Contact Info    Dm Stallings DO Family Medicine Call   [address],  [address]  [phone]            Discharge Medication List as of 12/26/2022  2:20 AM      CONTINUE these medications which have NOT CHANGED    Details   acetaminophen (TYLENOL) 650 mg CR tablet Take 1 tablet (650 mg total) by mouth every 8 (eight) hours as needed for mild pain, Starting Tue 10/26/2021, Normal      amoxicillin (AMOXIL) 500 mg capsule Take 500 mg by mouth 3 (three) times a day, Starting Fri 11/5/2021, Historical Med      amphetamine-dextroamphetamine (ADDERALL XR) 10 MG 24 hr capsule Take 10 mg by mouth every morning, Starting Fri 1/7/2022, Historical Med      famotidine (PEPCID) 20 mg tablet Take 1 tablet (20 mg total) by mouth 2 (two) times a day, Starting Sat 8/14/2021, Normal      fexofenadine (ALLEGRA) 180 MG tablet Take 180 mg by mouth daily, Starting Tue 8/24/2021, Historical Med      FLUoxetine (PROzac) 10 mg capsule Take 10 mg by mouth daily, Starting Fri 1/7/2022, Historical Med      ibuprofen (MOTRIN) 800 mg tablet Take 1 tablet (800 mg total) by mouth every 8 (eight) hours as needed for mild pain, moderate pain or fever for up to 10 days, Starting Tue 10/26/2021, Until Fri 11/5/2021 at 2359, Normal      methocarbamol (ROBAXIN) 500 mg tablet Take 1 tablet (500 mg total) by mouth 2 (two) times a day as needed for muscle spasms, Starting Fri 2/4/2022, Normal      nystatin (MYCOSTATIN) 500,000 units/5 mL suspension Take 5 mL (500,000 Units total) by mouth 4 (four) times a day, Starting Tue 8/17/2021, Normal             No discharge procedures on file      PDMP Review     None          ED Provider  Electronically Signed by Mathew Hassan MD  12/26/22 9370

## 2022-12-26 NOTE — Clinical Note
Ronald Hawleyintosh was seen and treated in our emergency department on 12/26/2022  Diagnosis:     Inga M    She may return on this date:     Excused on 12/26     If you have any questions or concerns, please don't hesitate to call        Miya Morelos MD    ______________________________           _______________          _______________  Hospital Representative                              Date                                Time

## 2022-12-26 NOTE — Clinical Note
Fredrick Corrales was seen and treated in our emergency department on 12/26/2022  Diagnosis:     Inga M    She may return on this date:     Excused on 12/26     If you have any questions or concerns, please don't hesitate to call        Amelia Gonzales MD    ______________________________           _______________          _______________  Hospital Representative                              Date                                Time

## 2023-02-15 ENCOUNTER — APPOINTMENT (OUTPATIENT)
Dept: LAB | Facility: HOSPITAL | Age: 28
End: 2023-02-15

## 2024-02-06 ENCOUNTER — APPOINTMENT (OUTPATIENT)
Dept: URGENT CARE | Facility: CLINIC | Age: 29
End: 2024-02-06

## 2024-02-08 ENCOUNTER — HOSPITAL ENCOUNTER (EMERGENCY)
Facility: HOSPITAL | Age: 29
Discharge: HOME | End: 2024-02-08
Attending: EMERGENCY MEDICINE
Payer: COMMERCIAL

## 2024-02-08 ENCOUNTER — APPOINTMENT (EMERGENCY)
Dept: RADIOLOGY | Facility: HOSPITAL | Age: 29
End: 2024-02-08
Payer: COMMERCIAL

## 2024-02-08 VITALS
TEMPERATURE: 97.9 F | OXYGEN SATURATION: 100 % | SYSTOLIC BLOOD PRESSURE: 139 MMHG | DIASTOLIC BLOOD PRESSURE: 88 MMHG | RESPIRATION RATE: 15 BRPM | BODY MASS INDEX: 24.72 KG/M2 | WEIGHT: 144 LBS | HEART RATE: 99 BPM

## 2024-02-08 DIAGNOSIS — S93.402A LEFT ANKLE SPRAIN: Primary | ICD-10-CM

## 2024-02-08 PROCEDURE — 73610 X-RAY EXAM OF ANKLE: CPT

## 2024-02-08 PROCEDURE — 99285 EMERGENCY DEPT VISIT HI MDM: CPT | Performed by: PHYSICIAN ASSISTANT

## 2024-02-08 PROCEDURE — 99283 EMERGENCY DEPT VISIT LOW MDM: CPT

## 2024-02-08 PROCEDURE — 73590 X-RAY EXAM OF LOWER LEG: CPT

## 2024-02-08 PROCEDURE — 73630 X-RAY EXAM OF FOOT: CPT

## 2024-02-08 RX ORDER — IBUPROFEN 600 MG/1
600 TABLET ORAL ONCE
Status: COMPLETED | OUTPATIENT
Start: 2024-02-08 | End: 2024-02-08

## 2024-02-08 RX ORDER — ACETAMINOPHEN 325 MG/1
650 TABLET ORAL ONCE
Status: COMPLETED | OUTPATIENT
Start: 2024-02-08 | End: 2024-02-08

## 2024-02-08 RX ADMIN — IBUPROFEN 600 MG: 600 TABLET, FILM COATED ORAL at 09:33

## 2024-02-08 RX ADMIN — ACETAMINOPHEN 650 MG: 325 TABLET ORAL at 09:33

## 2024-02-08 NOTE — ED PROVIDER NOTES
History  Chief Complaint   Patient presents with    Ankle Pain     Pt was playing basketball, jumped up and came down and rolled her left ankle yesterday. This morning she woke up with increased pain and swelling with difficulty weightbearing. Tylenol last taken 2000 last night      28 year old female presenting for evaluation of left ankle injury.  Pt notes she was playing basketball with the kids last night.  She notes she jumped and then came down and rolled the ankle.  She reports it bent in and she felt a pop.  She then notes it bent the opposite way and popped again.  She reports pain throughout the ankle.  She reports it radiates into her foot and up her leg.  She notes increased pain with movement and attempting to bear weight.  It has been swollen.  Denies numbness, tingling.  No other injuries reported.  Did not hit head, no LOC, etc.  Pt otherwise feels well and offers no other complaints.  She notes she has sprained her ankles multiple times in the past.  Has iced and took a dose of tylenol last night.  No meds taken today.  No reported alleviating factors.      History provided by:  Patient and medical records   used: No    Ankle Pain  Location:  Ankle  Injury: yes    Ankle location:  L ankle  Chronicity:  New  Prior injury to area:  Yes  Relieved by:  Nothing  Worsened by:  Bearing weight  Ineffective treatments:  Ice and rest  Associated symptoms: decreased ROM and swelling    Associated symptoms: no back pain, no fatigue, no fever, no neck pain, no numbness and no tingling    Risk factors: no concern for non-accidental trauma, no frequent fractures, no known bone disorder and no recent illness        Prior to Admission Medications   Prescriptions Last Dose Informant Patient Reported? Taking?   FLUoxetine (PROzac) 10 mg capsule   Yes No   Sig: Take 10 mg by mouth daily   acetaminophen (TYLENOL) 650 mg CR tablet   No No   Sig: Take 1 tablet (650 mg total) by mouth every 8 (eight)  hours as needed for mild pain   amoxicillin (AMOXIL) 500 mg capsule   Yes No   Sig: Take 500 mg by mouth 3 (three) times a day   amphetamine-dextroamphetamine (ADDERALL XR) 10 MG 24 hr capsule   Yes No   Sig: Take 10 mg by mouth every morning   famotidine (PEPCID) 20 mg tablet   No No   Sig: Take 1 tablet (20 mg total) by mouth 2 (two) times a day   fexofenadine (ALLEGRA) 180 MG tablet   Yes No   Sig: Take 180 mg by mouth daily   ibuprofen (MOTRIN) 800 mg tablet   No No   Sig: Take 1 tablet (800 mg total) by mouth every 8 (eight) hours as needed for mild pain, moderate pain or fever for up to 10 days   methocarbamol (ROBAXIN) 500 mg tablet   No No   Sig: Take 1 tablet (500 mg total) by mouth 2 (two) times a day as needed for muscle spasms   nystatin (MYCOSTATIN) 500,000 units/5 mL suspension   No No   Sig: Take 5 mL (500,000 Units total) by mouth 4 (four) times a day   Patient not taking: Reported on 9/17/2021      Facility-Administered Medications: None       Past Medical History:   Diagnosis Date    Anxiety 3/10/2015    CMV (cytomegalovirus infection) (HCC)     age 9    Colon polyp     Drug addiction (HCC)     Family hx of colon cancer     mother       Past Surgical History:   Procedure Laterality Date    COLONOSCOPY      ELBOW SURGERY Right     TONSILECTOMY AND ADNOIDECTOMY      TONSILLECTOMY      TRICEPS TENDON RELEASE Right        Family History   Problem Relation Age of Onset    Diabetes Mother     Lupus Mother     Rheumatologic disease Mother     Colon cancer Mother     No Known Problems Father     Hypertension Sister     Cancer Neg Hx      I have reviewed and agree with the history as documented.    E-Cigarette/Vaping    E-Cigarette Use Never User      E-Cigarette/Vaping Substances    Nicotine No     THC No     CBD No     Flavoring No     Other No     Unknown No      Social History     Tobacco Use    Smoking status: Every Day     Current packs/day: 0.00     Average packs/day: 1 pack/day for 9.0 years (9.0  ttl pk-yrs)     Types: Cigarettes     Start date: 2012     Last attempt to quit: 2021     Years since quittin.5    Smokeless tobacco: Never   Vaping Use    Vaping status: Never Used   Substance Use Topics    Alcohol use: Not Currently    Drug use: No       Review of Systems   Constitutional: Negative.  Negative for chills, fatigue and fever.   HENT: Negative.     Eyes: Negative.  Negative for visual disturbance.   Respiratory: Negative.  Negative for cough, shortness of breath and wheezing.    Cardiovascular: Negative.  Negative for chest pain.   Gastrointestinal: Negative.  Negative for abdominal pain, diarrhea, nausea and vomiting.   Genitourinary: Negative.  Negative for flank pain.   Musculoskeletal:  Positive for arthralgias, gait problem and joint swelling. Negative for back pain and neck pain.   Skin: Negative.  Negative for rash and wound.   Neurological:  Negative for dizziness, light-headedness, numbness and headaches.   Psychiatric/Behavioral: Negative.     All other systems reviewed and are negative.      Physical Exam  Physical Exam  Vitals and nursing note reviewed.   Constitutional:       General: She is awake. She is not in acute distress.     Appearance: She is well-developed and normal weight. She is not toxic-appearing.   HENT:      Head: Normocephalic and atraumatic.      Right Ear: Hearing and external ear normal.      Left Ear: Hearing and external ear normal.      Nose: Nose normal.      Mouth/Throat:      Mouth: Mucous membranes are moist.      Pharynx: Oropharynx is clear.   Eyes:      General: Lids are normal. No scleral icterus.     Conjunctiva/sclera: Conjunctivae normal.      Pupils: Pupils are equal, round, and reactive to light.   Neck:      Trachea: Trachea and phonation normal. No tracheal deviation.   Cardiovascular:      Rate and Rhythm: Normal rate and regular rhythm.      Pulses: Normal pulses.           Dorsalis pedis pulses are 2+ on the right side and 2+ on the  left side.        Posterior tibial pulses are 2+ on the right side and 2+ on the left side.      Heart sounds: Normal heart sounds, S1 normal and S2 normal. No murmur heard.  Pulmonary:      Effort: Pulmonary effort is normal. No tachypnea or respiratory distress.      Breath sounds: Normal breath sounds.   Musculoskeletal:      Cervical back: Normal range of motion and neck supple.      Left knee: No swelling, deformity or bony tenderness. Normal range of motion. Normal pulse.      Left ankle: Swelling present. No deformity, ecchymosis or lacerations. Tenderness present over the lateral malleolus. No base of 5th metatarsal or proximal fibula tenderness. Decreased range of motion. Normal pulse.      Left foot: Normal capillary refill. No swelling, deformity, laceration or bony tenderness. Normal pulse.   Skin:     General: Skin is warm and dry.      Capillary Refill: Capillary refill takes less than 2 seconds.      Findings: No bruising, laceration or rash.   Neurological:      General: No focal deficit present.      Mental Status: She is alert and oriented to person, place, and time.      GCS: GCS eye subscore is 4. GCS verbal subscore is 5. GCS motor subscore is 6.      Cranial Nerves: No cranial nerve deficit.      Sensory: Sensation is intact. No sensory deficit.      Motor: Motor function is intact. No abnormal muscle tone.      Gait: Gait abnormal.   Psychiatric:         Mood and Affect: Mood normal.         Speech: Speech normal.         Behavior: Behavior normal. Behavior is cooperative.         Vital Signs  ED Triage Vitals [02/08/24 0916]   Temperature Pulse Respirations Blood Pressure SpO2   97.9 °F (36.6 °C) 99 15 139/88 100 %      Temp Source Heart Rate Source Patient Position - Orthostatic VS BP Location FiO2 (%)   Temporal Monitor Sitting Left arm --      Pain Score       9           Vitals:    02/08/24 0916   BP: 139/88   Pulse: 99   Patient Position - Orthostatic VS: Sitting         Visual  Acuity      ED Medications  Medications   ibuprofen (MOTRIN) tablet 600 mg (600 mg Oral Given 2/8/24 0933)   acetaminophen (TYLENOL) tablet 650 mg (650 mg Oral Given 2/8/24 0933)       Diagnostic Studies  Results Reviewed       None                   XR foot 3+ views LEFT    (Results Pending)   XR ankle 3+ views LEFT    (Results Pending)   XR tibia fibula 2 views LEFT    (Results Pending)              Procedures  Splint application    Date/Time: 2/8/2024 10:00 AM    Performed by: Liberty Ferreira PA-C  Authorized by: Liberty Ferreira PA-C  [place] Protocol:  Procedure performed by: (RN)  Consent: Verbal consent obtained.  Risks and benefits: risks, benefits and alternatives were discussed  Consent given by: patient  Patient understanding: patient states understanding of the procedure being performed  Site marked: the operative site was marked  Radiology Images displayed and confirmed. If images not available, report reviewed: imaging studies available  Required items: required blood products, implants, devices, and special equipment available  Patient identity confirmed: arm band and provided demographic data    Pre-procedure details:     Sensation:  Normal    Skin color:  Pink  Procedure details:     Laterality:  Left    Location:  Ankle    Ankle:  L ankle    Splint type:  Short leg    Supplies used: cam walker.  Post-procedure details:     Pain:  Improved    Sensation:  Normal    Skin color:  Pink    Patient tolerance of procedure:  Tolerated well, no immediate complications           ED Course  ED Course as of 02/08/24 1013   Thu Feb 08, 2024   0946 XR foot 3+ views LEFT  Independently viewed and interpreted by me - no fracture or acute osseous findings; pending official read.   0946 XR ankle 3+ views LEFT  Independently viewed and interpreted by me - no fracture or acute osseous findings, soft tissue swelling; pending official read.   0946 XR tibia fibula 2 views LEFT  Independently viewed and interpreted  by me - no fracture or acute osseous findings; pending official read.   0949 Pt updated on results and prelim interpretation.  Xrays reviewed.  Likely sprain.  Will place in cam boot and discharge with symptomatic management and outpatient follow up.  She declines crutches.                               SBIRT 22yo+      Flowsheet Row Most Recent Value   Initial Alcohol Screen: US AUDIT-C     1. How often do you have a drink containing alcohol? 0 Filed at: 02/08/2024 0916   2. How many drinks containing alcohol do you have on a typical day you are drinking?  0 Filed at: 02/08/2024 0916   3a. Male UNDER 65: How often do you have five or more drinks on one occasion? 0 Filed at: 02/08/2024 0916   3b. FEMALE Any Age, or MALE 65+: How often do you have 4 or more drinks on one occassion? 0 Filed at: 02/08/2024 0916   Audit-C Score 0 Filed at: 02/08/2024 0916   MAGDALENO: How many times in the past year have you...    Used an illegal drug or used a prescription medication for non-medical reasons? Never Filed at: 02/08/2024 0916                      Medical Decision Making  27 yo female presenting for evaluation of left ankle injury.  Will obtain xray to evaluate for fracture.  Do not suspect dislocation based on anatomic alignment.  Has sprain at a minimum.  Pt declined ice as she felt the ice made it feel worse.    Xray without noted fracture, dislocation.  Will provide cam walker and discharge with symptomatic management.    Reviewed RICE therapy.  Continue OTC tylenol and ibuprofen as needed for pain relief.  Strict return precautions outlined.  Advised outpatient follow up with orthopedics or return to ER for change in condition as outlined. Pt verbalized understanding and had no further questions.    Please refer to above ER course for further details/discussion.      Problems Addressed:  Left ankle sprain: acute illness or injury    Amount and/or Complexity of Data Reviewed  External Data Reviewed: notes.  Radiology:  ordered and independent interpretation performed. Decision-making details documented in ED Course.    Risk  OTC drugs.  Prescription drug management.             Disposition  Final diagnoses:   Left ankle sprain     Time reflects when diagnosis was documented in both MDM as applicable and the Disposition within this note       Time User Action Codes Description Comment    2/8/2024  9:50 AM Liberty Ferreira Add [S93.402A] Left ankle sprain           ED Disposition       ED Disposition   Discharge    Condition   Stable    Date/Time   Thu Feb 8, 2024 0950    Comment   Inga Villegas discharge to home/self care.                   Follow-up Information       Follow up With Specialties Details Why Contact Info Additional Information    [facility] [place] [facility] Schedule an appointment as soon as possible for a visit   [address] [place] [id number] [phone] [facility] [place], [address] [place] [address], [id number] [phone]    [facility] Emergency Medicine  As needed [address] [facility] [address]  [phone] [facility], [address], [place], [address]            Patient's Medications   Discharge Prescriptions    No medications on file       No discharge procedures on file.    PDMP Review       None            ED Provider  Electronically Signed by             Liberty Ferreira PA-C  02/08/24 3227

## 2024-02-08 NOTE — DISCHARGE INSTRUCTIONS
Rest, ice, compression, elevation.  OTC tylenol and ibuprofen for pain relief.  Can bear weight as tolerated.  Follow up with orthopedics or return to ER as needed.

## 2024-02-29 ENCOUNTER — APPOINTMENT (EMERGENCY)
Dept: CT IMAGING | Facility: HOSPITAL | Age: 29
End: 2024-02-29
Payer: COMMERCIAL

## 2024-02-29 ENCOUNTER — HOSPITAL ENCOUNTER (EMERGENCY)
Facility: HOSPITAL | Age: 29
Discharge: HOME | End: 2024-02-29
Attending: EMERGENCY MEDICINE
Payer: COMMERCIAL

## 2024-02-29 VITALS
TEMPERATURE: 97.3 F | DIASTOLIC BLOOD PRESSURE: 63 MMHG | OXYGEN SATURATION: 99 % | HEART RATE: 53 BPM | SYSTOLIC BLOOD PRESSURE: 101 MMHG | WEIGHT: 145 LBS | BODY MASS INDEX: 24.89 KG/M2 | RESPIRATION RATE: 18 BRPM

## 2024-02-29 DIAGNOSIS — K52.9 ENTERITIS: Primary | ICD-10-CM

## 2024-02-29 DIAGNOSIS — R17 ELEVATED BILIRUBIN: ICD-10-CM

## 2024-02-29 LAB
ALBUMIN SERPL BCP-MCNC: 4.7 G/DL (ref 3.5–5)
ALP SERPL-CCNC: 49 U/L (ref 34–104)
ALT SERPL W P-5'-P-CCNC: 13 U/L (ref 7–52)
ANION GAP SERPL CALCULATED.3IONS-SCNC: 13 MMOL/L
ANISOCYTOSIS BLD QL SMEAR: PRESENT
AST SERPL W P-5'-P-CCNC: 16 U/L (ref 13–39)
BACTERIA UR QL AUTO: ABNORMAL /HPF
BASOPHILS # BLD MANUAL: 0 THOUSAND/UL (ref 0–0.1)
BASOPHILS NFR MAR MANUAL: 0 % (ref 0–1)
BILIRUB SERPL-MCNC: 3.48 MG/DL (ref 0.2–1)
BILIRUB UR QL STRIP: ABNORMAL
BUN SERPL-MCNC: 23 MG/DL (ref 5–25)
CALCIUM SERPL-MCNC: 9.7 MG/DL (ref 8.4–10.2)
CHLORIDE SERPL-SCNC: 104 MMOL/L (ref 96–108)
CLARITY UR: CLEAR
CO2 SERPL-SCNC: 20 MMOL/L (ref 21–32)
COLOR UR: YELLOW
CREAT SERPL-MCNC: 1.09 MG/DL (ref 0.6–1.3)
EOSINOPHIL # BLD MANUAL: 0 THOUSAND/UL (ref 0–0.4)
EOSINOPHIL NFR BLD MANUAL: 0 % (ref 0–6)
ERYTHROCYTE [DISTWIDTH] IN BLOOD BY AUTOMATED COUNT: 11.9 % (ref 11.6–15.1)
GFR SERPL CREATININE-BSD FRML MDRD: 68 ML/MIN/1.73SQ M
GLUCOSE SERPL-MCNC: 83 MG/DL (ref 65–140)
GLUCOSE UR STRIP-MCNC: NEGATIVE MG/DL
HCG SERPL QL: NEGATIVE
HCT VFR BLD AUTO: 43.3 % (ref 34.8–46.1)
HGB BLD-MCNC: 15.1 G/DL (ref 11.5–15.4)
HGB UR QL STRIP.AUTO: ABNORMAL
KETONES UR STRIP-MCNC: ABNORMAL MG/DL
LEUKOCYTE ESTERASE UR QL STRIP: NEGATIVE
LG PLATELETS BLD QL SMEAR: PRESENT
LIPASE SERPL-CCNC: 10 U/L (ref 11–82)
LYMPHOCYTES # BLD AUTO: 1.04 THOUSAND/UL (ref 0.6–4.47)
LYMPHOCYTES # BLD AUTO: 11 % (ref 14–44)
MCH RBC QN AUTO: 30.2 PG (ref 26.8–34.3)
MCHC RBC AUTO-ENTMCNC: 34.9 G/DL (ref 31.4–37.4)
MCV RBC AUTO: 87 FL (ref 82–98)
MONOCYTES # BLD AUTO: 0.19 THOUSAND/UL (ref 0–1.22)
MONOCYTES NFR BLD: 2 % (ref 4–12)
MUCOUS THREADS UR QL AUTO: ABNORMAL
NEUTROPHILS # BLD MANUAL: 8.2 THOUSAND/UL (ref 1.85–7.62)
NEUTS BAND NFR BLD MANUAL: 6 % (ref 0–8)
NEUTS HYPERSEG BLD QL SMEAR: PRESENT
NEUTS SEG NFR BLD AUTO: 81 % (ref 43–75)
NITRITE UR QL STRIP: NEGATIVE
NON-SQ EPI CELLS URNS QL MICRO: ABNORMAL /HPF
PH UR STRIP.AUTO: 5.5 [PH]
PLATELET # BLD AUTO: 221 THOUSANDS/UL (ref 149–390)
PLATELET BLD QL SMEAR: ADEQUATE
PMV BLD AUTO: 9.5 FL (ref 8.9–12.7)
POTASSIUM SERPL-SCNC: 3.9 MMOL/L (ref 3.5–5.3)
PROT SERPL-MCNC: 7.6 G/DL (ref 6.4–8.4)
PROT UR STRIP-MCNC: ABNORMAL MG/DL
RBC # BLD AUTO: 5 MILLION/UL (ref 3.81–5.12)
RBC #/AREA URNS AUTO: ABNORMAL /HPF
SODIUM SERPL-SCNC: 137 MMOL/L (ref 135–147)
SP GR UR STRIP.AUTO: >=1.03 (ref 1–1.03)
STOMATOCYTES BLD QL SMEAR: PRESENT
UROBILINOGEN UR QL STRIP.AUTO: 0.2 E.U./DL
WBC # BLD AUTO: 9.42 THOUSAND/UL (ref 4.31–10.16)
WBC #/AREA URNS AUTO: ABNORMAL /HPF

## 2024-02-29 PROCEDURE — 99284 EMERGENCY DEPT VISIT MOD MDM: CPT

## 2024-02-29 PROCEDURE — 85007 BL SMEAR W/DIFF WBC COUNT: CPT | Performed by: EMERGENCY MEDICINE

## 2024-02-29 PROCEDURE — 96361 HYDRATE IV INFUSION ADD-ON: CPT

## 2024-02-29 PROCEDURE — 84703 CHORIONIC GONADOTROPIN ASSAY: CPT | Performed by: EMERGENCY MEDICINE

## 2024-02-29 PROCEDURE — 83690 ASSAY OF LIPASE: CPT | Performed by: EMERGENCY MEDICINE

## 2024-02-29 PROCEDURE — 96374 THER/PROPH/DIAG INJ IV PUSH: CPT

## 2024-02-29 PROCEDURE — 85027 COMPLETE CBC AUTOMATED: CPT | Performed by: EMERGENCY MEDICINE

## 2024-02-29 PROCEDURE — 96375 TX/PRO/DX INJ NEW DRUG ADDON: CPT

## 2024-02-29 PROCEDURE — 36415 COLL VENOUS BLD VENIPUNCTURE: CPT | Performed by: EMERGENCY MEDICINE

## 2024-02-29 PROCEDURE — 99285 EMERGENCY DEPT VISIT HI MDM: CPT | Performed by: EMERGENCY MEDICINE

## 2024-02-29 PROCEDURE — 81001 URINALYSIS AUTO W/SCOPE: CPT | Performed by: EMERGENCY MEDICINE

## 2024-02-29 PROCEDURE — 80053 COMPREHEN METABOLIC PANEL: CPT | Performed by: EMERGENCY MEDICINE

## 2024-02-29 PROCEDURE — 74177 CT ABD & PELVIS W/CONTRAST: CPT

## 2024-02-29 RX ORDER — DICYCLOMINE HCL 20 MG
20 TABLET ORAL ONCE
Status: COMPLETED | OUTPATIENT
Start: 2024-02-29 | End: 2024-02-29

## 2024-02-29 RX ORDER — KETOROLAC TROMETHAMINE 30 MG/ML
15 INJECTION, SOLUTION INTRAMUSCULAR; INTRAVENOUS ONCE
Status: COMPLETED | OUTPATIENT
Start: 2024-02-29 | End: 2024-02-29

## 2024-02-29 RX ORDER — ONDANSETRON 2 MG/ML
4 INJECTION INTRAMUSCULAR; INTRAVENOUS ONCE
Status: COMPLETED | OUTPATIENT
Start: 2024-02-29 | End: 2024-02-29

## 2024-02-29 RX ADMIN — KETOROLAC TROMETHAMINE 15 MG: 30 INJECTION, SOLUTION INTRAMUSCULAR; INTRAVENOUS at 03:54

## 2024-02-29 RX ADMIN — DICYCLOMINE HYDROCHLORIDE 20 MG: 20 TABLET ORAL at 05:45

## 2024-02-29 RX ADMIN — ONDANSETRON 4 MG: 2 INJECTION INTRAMUSCULAR; INTRAVENOUS at 03:21

## 2024-02-29 RX ADMIN — SODIUM CHLORIDE 1000 ML: 0.9 INJECTION, SOLUTION INTRAVENOUS at 03:20

## 2024-02-29 RX ADMIN — IOHEXOL 100 ML: 350 INJECTION, SOLUTION INTRAVENOUS at 05:32

## 2024-02-29 NOTE — ED PROVIDER NOTES
History  Chief Complaint   Patient presents with    Vomiting     Onset  today. Unable to tolerate food/fluids. C/O diarrhea, abdominal pain [sharp, burning] along w/ nausea     Inga Villegas is a 28 y.o. year old female with PMH of anxiety presenting to the [facility] ED for vomiting and abdominal. Patient reports onset of nausea, generalized abdominal discomfort and vomiting approximately 1600 this evening.  She is currently on her period however otherwise in the normal state of health today.  Since that time patient reporting numerous episodes of vomiting, generalized abdominal pain rated as 9/10 and also numerous episodes of watery/nonbloody diarrhea.  Patient reporting chills.  Patient denies chest pain or dyspnea.  No dysuria or flank pain. Patient denies history of prior abdominal surgeries. No recent travel. No sick contacts. Patient has not taken/received any medications at home for relief of symptoms.      History provided by:  Medical records and patient   used: No    Vomiting  Associated symptoms: abdominal pain, chills and diarrhea    Associated symptoms: no cough and no fever        Prior to Admission Medications   Prescriptions Last Dose Informant Patient Reported? Taking?   FLUoxetine (PROzac) 10 mg capsule Not Taking  Yes No   Sig: Take 10 mg by mouth daily   Patient not taking: Reported on 2/29/2024   acetaminophen (TYLENOL) 650 mg CR tablet   No No   Sig: Take 1 tablet (650 mg total) by mouth every 8 (eight) hours as needed for mild pain   amoxicillin (AMOXIL) 500 mg capsule   Yes No   Sig: Take 500 mg by mouth 3 (three) times a day   amphetamine-dextroamphetamine (ADDERALL XR) 10 MG 24 hr capsule   Yes No   Sig: Take 10 mg by mouth every morning   famotidine (PEPCID) 20 mg tablet Not Taking  No No   Sig: Take 1 tablet (20 mg total) by mouth 2 (two) times a day   Patient not taking: Reported on 2/29/2024   fexofenadine (ALLEGRA) 180 MG tablet Not Taking  Yes No   Sig: Take 180 mg  by mouth daily   Patient not taking: Reported on 2024   ibuprofen (MOTRIN) 800 mg tablet   No No   Sig: Take 1 tablet (800 mg total) by mouth every 8 (eight) hours as needed for mild pain, moderate pain or fever for up to 10 days   methocarbamol (ROBAXIN) 500 mg tablet   No No   Sig: Take 1 tablet (500 mg total) by mouth 2 (two) times a day as needed for muscle spasms   nystatin (MYCOSTATIN) 500,000 units/5 mL suspension   No No   Sig: Take 5 mL (500,000 Units total) by mouth 4 (four) times a day   Patient not taking: Reported on 2021      Facility-Administered Medications: None       Past Medical History:   Diagnosis Date    Anxiety 3/10/2015    CMV (cytomegalovirus infection) (HCC)     age 9    Colon polyp     Drug addiction (HCC)     Family hx of colon cancer     mother       Past Surgical History:   Procedure Laterality Date    COLONOSCOPY      ELBOW SURGERY Right     TONSILECTOMY AND ADNOIDECTOMY      TONSILLECTOMY      TRICEPS TENDON RELEASE Right        Family History   Problem Relation Age of Onset    Diabetes Mother     Lupus Mother     Rheumatologic disease Mother     Colon cancer Mother     No Known Problems Father     Hypertension Sister     Cancer Neg Hx      I have reviewed and agree with the history as documented.    E-Cigarette/Vaping    E-Cigarette Use Never User      E-Cigarette/Vaping Substances    Nicotine No     THC No     CBD No     Flavoring No     Other No     Unknown No      Social History     Tobacco Use    Smoking status: Every Day     Current packs/day: 0.00     Average packs/day: 1 pack/day for 9.0 years (9.0 ttl pk-yrs)     Types: Cigarettes     Start date: 2012     Last attempt to quit: 2021     Years since quittin.6    Smokeless tobacco: Never   Vaping Use    Vaping status: Never Used   Substance Use Topics    Alcohol use: Not Currently    Drug use: No       Review of Systems   Constitutional:  Positive for chills. Negative for fever.   HENT:  Negative for  congestion.    Respiratory:  Negative for cough and shortness of breath.    Cardiovascular:  Negative for chest pain.   Gastrointestinal:  Positive for abdominal pain, diarrhea, nausea and vomiting.   Genitourinary:  Positive for vaginal bleeding (currently on period). Negative for dysuria and flank pain.   Musculoskeletal:  Negative for back pain.   All other systems reviewed and are negative.      Physical Exam  Physical Exam  Vitals and nursing note reviewed.   Constitutional:       General: She is not in acute distress.     Appearance: Normal appearance. She is well-developed. She is not ill-appearing, toxic-appearing or diaphoretic.   HENT:      Head: Normocephalic and atraumatic.      Nose: No congestion or rhinorrhea.   Eyes:      General:         Right eye: No discharge.         Left eye: No discharge.   Cardiovascular:      Rate and Rhythm: Normal rate and regular rhythm.   Pulmonary:      Effort: Pulmonary effort is normal. No accessory muscle usage or respiratory distress.      Breath sounds: Normal breath sounds. No stridor. No decreased breath sounds, wheezing, rhonchi or rales.   Abdominal:      General: There is no distension.      Palpations: Abdomen is soft.      Tenderness: There is abdominal tenderness in the epigastric area. There is no right CVA tenderness, left CVA tenderness, guarding or rebound.   Musculoskeletal:      Cervical back: Normal range of motion and neck supple. No rigidity.      Right lower leg: No tenderness.      Left lower leg: No tenderness.   Skin:     Capillary Refill: Capillary refill takes less than 2 seconds.      Findings: No rash.   Neurological:      Mental Status: She is alert and oriented to person, place, and time.   Psychiatric:         Mood and Affect: Mood normal.         Behavior: Behavior normal.         Vital Signs  ED Triage Vitals [02/29/24 0248]   Temperature Pulse Respirations Blood Pressure SpO2   (!) 97.3 °F (36.3 °C) 92 18 123/81 97 %      Temp Source  Heart Rate Source Patient Position - Orthostatic VS BP Location FiO2 (%)   Tympanic Monitor Sitting Left arm --      Pain Score       9           Vitals:    02/29/24 0248 02/29/24 0547   BP: 123/81 101/63   Pulse: 92 (!) 53   Patient Position - Orthostatic VS: Sitting Lying         Visual Acuity      ED Medications  Medications   ondansetron (ZOFRAN) injection 4 mg (4 mg Intravenous Given 2/29/24 0321)   ketorolac (TORADOL) injection 15 mg (15 mg Intravenous Given 2/29/24 0354)   sodium chloride 0.9 % bolus 1,000 mL (0 mL Intravenous Stopped 2/29/24 0354)   dicyclomine (BENTYL) tablet 20 mg (20 mg Oral Given 2/29/24 0545)   iohexol (OMNIPAQUE) 350 MG/ML injection (MULTI-DOSE) 100 mL (100 mL Intravenous Given 2/29/24 0532)       Diagnostic Studies  Results Reviewed       Procedure Component Value Units Date/Time    Urine Microscopic [[id number]]  (Abnormal) Collected: 02/29/24 0429    Lab Status: Final result Specimen: Urine, Clean Catch Updated: 02/29/24 0505     RBC, UA 0-1 /hpf      WBC, UA 2-4 /hpf      Epithelial Cells Moderate /hpf      Bacteria, UA Occasional /hpf      MUCUS THREADS Occasional    RBC Morphology Reflex Test [[id number]] Collected: 02/29/24 0321    Lab Status: Final result Specimen: Blood from Arm, Right Updated: 02/29/24 0501    UA w Reflex to Microscopic w Reflex to Culture [[id number]]  (Abnormal) Collected: 02/29/24 0429    Lab Status: Final result Specimen: Urine, Clean Catch Updated: 02/29/24 0454     Color, UA Yellow     Clarity, UA Clear     Specific Gravity, UA >=1.030     pH, UA 5.5     Leukocytes, UA Negative     Nitrite, UA Negative     Protein, UA Trace mg/dl      Glucose, UA Negative mg/dl      Ketones, UA 40 (2+) mg/dl      Urobilinogen, UA 0.2 E.U./dl      Bilirubin, UA Small     Occult Blood, UA Moderate    Manual Differential(PHLEBS Do Not Order) [[id number]]  (Abnormal) Collected: 02/29/24 0321    Lab Status: Final result Specimen: Blood from Arm, Right Updated: 02/29/24 0410      Segmented % 81 %      Bands % 6 %      Lymphocytes % 11 %      Monocytes % 2 %      Eosinophils, % 0 %      Basophils % 0 %      Absolute Neutrophils 8.20 Thousand/uL      Lymphocytes Absolute 1.04 Thousand/uL      Monocytes Absolute 0.19 Thousand/uL      Eosinophils Absolute 0.00 Thousand/uL      Basophils Absolute 0.00 Thousand/uL      Total Counted --     Hypersegmented Neutrophils Present     Platelet Estimate Adequate     Large Platelet Present     Anisocytosis Present     Stomatocytes Present    CBC and differential [[id number]]  (Normal) Collected: 02/29/24 0321    Lab Status: Final result Specimen: Blood from Arm, Right Updated: 02/29/24 0412     WBC 9.42 Thousand/uL      RBC 5.00 Million/uL      Hemoglobin 15.1 g/dL      Hematocrit 43.3 %      MCV 87 fL      MCH 30.2 pg      MCHC 34.9 g/dL      RDW 11.9 %      MPV 9.5 fL      Platelets 221 Thousands/uL     Narrative:      This is an appended report.  These results have been appended to a previously verified report.    hCG, qualitative pregnancy [[id number]]  (Normal) Collected: 02/29/24 0321    Lab Status: Final result Specimen: Blood from Arm, Right Updated: 02/29/24 0349     Preg, Serum Negative    Comprehensive metabolic panel [[id number]]  (Abnormal) Collected: 02/29/24 0321    Lab Status: Final result Specimen: Blood from Arm, Right Updated: 02/29/24 0344     Sodium 137 mmol/L      Potassium 3.9 mmol/L      Chloride 104 mmol/L      CO2 20 mmol/L      ANION GAP 13 mmol/L      BUN 23 mg/dL      Creatinine 1.09 mg/dL      Glucose 83 mg/dL      Calcium 9.7 mg/dL      AST 16 U/L      ALT 13 U/L      Alkaline Phosphatase 49 U/L      Total Protein 7.6 g/dL      Albumin 4.7 g/dL      Total Bilirubin 3.48 mg/dL      eGFR 68 ml/min/1.73sq m     Narrative:      National Kid[employer] Foundation guidelines for Chronic Kidney Disease (CKD):     Stage 1 with normal or high GFR (GFR > 90 mL/min/1.73 square meters)    Stage 2 Mild CKD (GFR = 60-89 mL/min/1.73 square  meters)    Stage 3A Moderate CKD (GFR = 45-59 mL/min/1.73 square meters)    Stage 3B Moderate CKD (GFR = 30-44 mL/min/1.73 square meters)    Stage 4 Severe CKD (GFR = 15-29 mL/min/1.73 square meters)    Stage 5 End Stage CKD (GFR <15 mL/min/1.73 square meters)  Note: GFR calculation is accurate only with a steady state creatinine    Lipase [[id number]]  (Abnormal) Collected: 02/29/24 0321    Lab Status: Final result Specimen: Blood from Arm, Right Updated: 02/29/24 0344     Lipase 10 u/L                    CT abdomen pelvis with contrast   Final Result by Zonia Mendez MD (02/29 0603)      Findings suggesting enteritis and diarrheal illness, as described above. Please see discussion. No bowel obstruction.      Borderline splenomegaly.         Workstation performed: [id number]                    Procedures  Procedures         ED Course  ED Course as of 03/01/24 0136   u Feb 29, 2024   0336 WBC: 9.42   0336 Hemoglobin: 15.1   0336 Platelet Count: 221   0725 Patient well appearing. Reviewed CT results at this time. She is requesting discharge.                                             Medical Decision Making    28 y.o. female presenting for vomiting/diarrhea and abdominal discomfort.  Vital signs stable on arrival.  Will order labs and CT to evaluate for leukocytosis, anemia, MARIO, electrolyte abnormality, biliary disease, pancreatitis, appendicitis.  Will check UA to screen for UTI/pyelonephritis.  Symptoms more likely suggestive of gastroenteritis.  Will treat symptomatically.    Reassessment: Patient nontoxic-appearing.  No vomiting during ED course.  Ambulatory in the room.  Requesting discharge.  I reviewed the lab and CT results with the patient including nonspecific elevation of bilirubin and borderline splenomegaly on CT for which I have requested outpatient follow-up with PCP.    Disposition: I have discussed with the patient our plan to discharge them from the ED and the patient is in agreement with  this plan.     Discharge Plan: Encouraged bland diet and oral hydration.  As needed Motrin for pain. RTED precautions emphasized. The patient was provided a written after visit summary with strict RTED precautions.     Followup: I have discussed with the patient plan to follow up with their PCP. Contact information provided in AVS.    Amount and/or Complexity of Data Reviewed  Labs: ordered. Decision-making details documented in ED Course.  Radiology: ordered.    Risk  Prescription drug management.             Disposition  Final diagnoses:   Enteritis   Elevated bilirubin     Time reflects when diagnosis was documented in both MDM as applicable and the Disposition within this note       Time User Action Codes Description Comment    2/29/2024  6:40 AM Mauricio Queen [K52.9] Enteritis     2/29/2024  7:26 AM Mauricio Queen [R17] Elevated bilirubin           ED Disposition       ED Disposition   Discharge    Condition   Stable    Date/Time   Thu Feb 29, 2024  6:40 AM    Comment   Inga Villegas discharge to home/self care.                   Follow-up Information       Follow up With Specialties Details Why Contact Info    Yara Roth,  Family Medicine Schedule an appointment as soon as possible for a visit  To make appointment for reevaluation in 3-5 days. [address] [place] [id number]  [phone]              Discharge Medication List as of 2/29/2024  7:26 AM        CONTINUE these medications which have NOT CHANGED    Details   acetaminophen (TYLENOL) 650 mg CR tablet Take 1 tablet (650 mg total) by mouth every 8 (eight) hours as needed for mild pain, Starting Tue 10/26/2021, Normal      amoxicillin (AMOXIL) 500 mg capsule Take 500 mg by mouth 3 (three) times a day, Starting Fri 11/5/2021, Historical Med      amphetamine-dextroamphetamine (ADDERALL XR) 10 MG 24 hr capsule Take 10 mg by mouth every morning, Starting Fri 1/7/2022, Historical Med      famotidine (PEPCID) 20 mg tablet Take 1  tablet (20 mg total) by mouth 2 (two) times a day, Starting Sat 8/14/2021, Normal      fexofenadine (ALLEGRA) 180 MG tablet Take 180 mg by mouth daily, Starting Tue 8/24/2021, Historical Med      FLUoxetine (PROzac) 10 mg capsule Take 10 mg by mouth daily, Starting Fri 1/7/2022, Historical Med      ibuprofen (MOTRIN) 800 mg tablet Take 1 tablet (800 mg total) by mouth every 8 (eight) hours as needed for mild pain, moderate pain or fever for up to 10 days, Starting Tue 10/26/2021, Until Fri 11/5/2021 at 2359, Normal      methocarbamol (ROBAXIN) 500 mg tablet Take 1 tablet (500 mg total) by mouth 2 (two) times a day as needed for muscle spasms, Starting Fri 2/4/2022, Normal      nystatin (MYCOSTATIN) 500,000 units/5 mL suspension Take 5 mL (500,000 Units total) by mouth 4 (four) times a day, Starting Tue 8/17/2021, Normal             No discharge procedures on file.    PDMP Review       None            ED Provider  Electronically Signed by             Mauricio Queen DO  03/01/24 9839

## 2024-02-29 NOTE — DISCHARGE INSTRUCTIONS
You have been seen for vomiting and abdominal pain. Please take motrin as needed for pain. Return to the emergency department if you develop worsening pain, vomiting, fevers or any other symptoms of concern. Please follow up with your PCP by calling the number provided.

## 2024-02-29 NOTE — Clinical Note
Inga Villegas was seen and treated in our emergency department on 2/29/2024.                Diagnosis: enteritis    Inga TO  is off the rest of the shift today.    She may return on this date:          If you have any questions or concerns, please don't hesitate to call.      Mauricio Queen, DO    ______________________________           _______________          _______________  Hospital Representative                              Date                                Time

## 2024-06-23 ENCOUNTER — HOSPITAL ENCOUNTER (EMERGENCY)
Facility: HOSPITAL | Age: 29
Discharge: HOME | End: 2024-06-23
Attending: EMERGENCY MEDICINE
Payer: COMMERCIAL

## 2024-06-23 ENCOUNTER — OFFICE VISIT (OUTPATIENT)
Dept: URGENT CARE | Facility: MEDICAL CENTER | Age: 29
End: 2024-06-23
Payer: COMMERCIAL

## 2024-06-23 ENCOUNTER — APPOINTMENT (EMERGENCY)
Dept: CT IMAGING | Facility: HOSPITAL | Age: 29
End: 2024-06-23
Payer: COMMERCIAL

## 2024-06-23 VITALS
HEIGHT: 64 IN | RESPIRATION RATE: 14 BRPM | OXYGEN SATURATION: 99 % | BODY MASS INDEX: 25.27 KG/M2 | HEART RATE: 77 BPM | WEIGHT: 148 LBS | TEMPERATURE: 97.3 F | DIASTOLIC BLOOD PRESSURE: 87 MMHG | SYSTOLIC BLOOD PRESSURE: 139 MMHG

## 2024-06-23 VITALS
OXYGEN SATURATION: 99 % | DIASTOLIC BLOOD PRESSURE: 73 MMHG | RESPIRATION RATE: 18 BRPM | HEIGHT: 64 IN | SYSTOLIC BLOOD PRESSURE: 118 MMHG | WEIGHT: 148 LBS | TEMPERATURE: 97.3 F | HEART RATE: 76 BPM | BODY MASS INDEX: 25.27 KG/M2

## 2024-06-23 DIAGNOSIS — R59.0 POSTAURICULAR ADENOPATHY: Primary | ICD-10-CM

## 2024-06-23 DIAGNOSIS — H92.01 MASTOID PAIN, RIGHT: Primary | ICD-10-CM

## 2024-06-23 LAB
ANION GAP SERPL CALCULATED.3IONS-SCNC: 8 MMOL/L (ref 4–13)
BASOPHILS # BLD AUTO: 0.04 THOUSANDS/ÂΜL (ref 0–0.1)
BASOPHILS NFR BLD AUTO: 1 % (ref 0–1)
BUN SERPL-MCNC: 19 MG/DL (ref 5–25)
CALCIUM SERPL-MCNC: 9.5 MG/DL (ref 8.4–10.2)
CHLORIDE SERPL-SCNC: 104 MMOL/L (ref 96–108)
CO2 SERPL-SCNC: 26 MMOL/L (ref 21–32)
CREAT SERPL-MCNC: 1.05 MG/DL (ref 0.6–1.3)
EOSINOPHIL # BLD AUTO: 0.13 THOUSAND/ÂΜL (ref 0–0.61)
EOSINOPHIL NFR BLD AUTO: 2 % (ref 0–6)
ERYTHROCYTE [DISTWIDTH] IN BLOOD BY AUTOMATED COUNT: 11.6 % (ref 11.6–15.1)
EXT PREGNANCY TEST URINE: NEGATIVE
EXT. CONTROL: NORMAL
GFR SERPL CREATININE-BSD FRML MDRD: 71 ML/MIN/1.73SQ M
GLUCOSE SERPL-MCNC: 85 MG/DL (ref 65–140)
HCT VFR BLD AUTO: 38.3 % (ref 34.8–46.1)
HGB BLD-MCNC: 13.2 G/DL (ref 11.5–15.4)
IMM GRANULOCYTES # BLD AUTO: 0.02 THOUSAND/UL (ref 0–0.2)
IMM GRANULOCYTES NFR BLD AUTO: 0 % (ref 0–2)
LYMPHOCYTES # BLD AUTO: 1.95 THOUSANDS/ÂΜL (ref 0.6–4.47)
LYMPHOCYTES NFR BLD AUTO: 24 % (ref 14–44)
MCH RBC QN AUTO: 29.5 PG (ref 26.8–34.3)
MCHC RBC AUTO-ENTMCNC: 34.5 G/DL (ref 31.4–37.4)
MCV RBC AUTO: 86 FL (ref 82–98)
MONOCYTES # BLD AUTO: 0.51 THOUSAND/ÂΜL (ref 0.17–1.22)
MONOCYTES NFR BLD AUTO: 6 % (ref 4–12)
NEUTROPHILS # BLD AUTO: 5.56 THOUSANDS/ÂΜL (ref 1.85–7.62)
NEUTS SEG NFR BLD AUTO: 67 % (ref 43–75)
NRBC BLD AUTO-RTO: 0 /100 WBCS
PLATELET # BLD AUTO: 233 THOUSANDS/UL (ref 149–390)
PMV BLD AUTO: 9.7 FL (ref 8.9–12.7)
POTASSIUM SERPL-SCNC: 3.5 MMOL/L (ref 3.5–5.3)
RBC # BLD AUTO: 4.47 MILLION/UL (ref 3.81–5.12)
SODIUM SERPL-SCNC: 138 MMOL/L (ref 135–147)
WBC # BLD AUTO: 8.21 THOUSAND/UL (ref 4.31–10.16)

## 2024-06-23 PROCEDURE — 99284 EMERGENCY DEPT VISIT MOD MDM: CPT | Performed by: EMERGENCY MEDICINE

## 2024-06-23 PROCEDURE — 99213 OFFICE O/P EST LOW 20 MIN: CPT | Performed by: STUDENT IN AN ORGANIZED HEALTH CARE EDUCATION/TRAINING PROGRAM

## 2024-06-23 PROCEDURE — 80048 BASIC METABOLIC PNL TOTAL CA: CPT | Performed by: EMERGENCY MEDICINE

## 2024-06-23 PROCEDURE — 85025 COMPLETE CBC W/AUTO DIFF WBC: CPT | Performed by: EMERGENCY MEDICINE

## 2024-06-23 PROCEDURE — 81025 URINE PREGNANCY TEST: CPT | Performed by: EMERGENCY MEDICINE

## 2024-06-23 PROCEDURE — 70481 CT ORBIT/EAR/FOSSA W/DYE: CPT

## 2024-06-23 PROCEDURE — 99283 EMERGENCY DEPT VISIT LOW MDM: CPT

## 2024-06-23 PROCEDURE — 36415 COLL VENOUS BLD VENIPUNCTURE: CPT | Performed by: EMERGENCY MEDICINE

## 2024-06-23 RX ADMIN — IOHEXOL 85 ML: 350 INJECTION, SOLUTION INTRAVENOUS at 17:38

## 2024-06-23 NOTE — PROGRESS NOTES
[facility]        NAME: Inga Villegas is a 28 y.o. female  : 1995    MRN: [id number]    Assessment and Plan   Mastoid pain, right [H92.01]  1. Mastoid pain, right  Transfer to other facility          28-year-old female p/w 2 days of right-sided mastoid pain which is now radiating to the right ear and posterior occiput.  Denies fever, chills, ear drainage, preceding URI, fatigue.  Vitals are stable, afebrile.  Referring to ER for rule out mastoiditis.  Patient will arrive by personal vehicle    Patient Instructions     See wrap up for details  Proceed to  ER if symptoms worsen.    Chief Complaint     Chief Complaint   Patient presents with    Earache    Headache         History of Present Illness     HPI    P/w onset of symptoms yesterday  Noticed a lump behind right ear while driving, pain raidates to right ear and then back of the head  Denies fever, chills, nausea, vomiting, neck stiffness or rigidity, fatigue.  Denies any recent ear infections or other URI or HEENT symptoms.    Review of Systems   Review of Systems   Constitutional:  Negative for chills and fever.   HENT:  Positive for ear pain. Negative for sore throat.    Eyes:  Negative for pain and visual disturbance.   Respiratory:  Negative for cough, chest tightness and shortness of breath.    Cardiovascular:  Negative for chest pain and palpitations.   Gastrointestinal:  Negative for abdominal pain, constipation, diarrhea, nausea and vomiting.   Genitourinary:  Negative for dysuria, hematuria and menstrual problem.   Musculoskeletal:  Negative for arthralgias and back pain.   Skin:  Negative for color change and rash.   Neurological:  Positive for headaches. Negative for seizures and syncope.   Psychiatric/Behavioral:  Negative for dysphoric mood and suicidal ideas.    All other systems reviewed and are negative.    Current Medications       Current Outpatient Medications:     amphetamine-dextroamphetamine (ADDERALL XR) 10 MG 24 hr  capsule, Take 10 mg by mouth every morning, Disp: , Rfl:     acetaminophen (TYLENOL) 650 mg CR tablet, Take 1 tablet (650 mg total) by mouth every 8 (eight) hours as needed for mild pain (Patient not taking: Reported on 6/23/2024), Disp: 30 tablet, Rfl: 0    amoxicillin (AMOXIL) 500 mg capsule, Take 500 mg by mouth 3 (three) times a day (Patient not taking: Reported on 6/23/2024), Disp: , Rfl:     famotidine (PEPCID) 20 mg tablet, Take 1 tablet (20 mg total) by mouth 2 (two) times a day (Patient not taking: Reported on 2/29/2024), Disp: 30 tablet, Rfl: 0    fexofenadine (ALLEGRA) 180 MG tablet, Take 180 mg by mouth daily (Patient not taking: Reported on 2/29/2024), Disp: , Rfl:     FLUoxetine (PROzac) 10 mg capsule, Take 10 mg by mouth daily (Patient not taking: Reported on 2/29/2024), Disp: , Rfl:     ibuprofen (MOTRIN) 800 mg tablet, Take 1 tablet (800 mg total) by mouth every 8 (eight) hours as needed for mild pain, moderate pain or fever for up to 10 days, Disp: 30 tablet, Rfl: 0    methocarbamol (ROBAXIN) 500 mg tablet, Take 1 tablet (500 mg total) by mouth 2 (two) times a day as needed for muscle spasms, Disp: 20 tablet, Rfl: 0    nystatin (MYCOSTATIN) 500,000 units/5 mL suspension, Take 5 mL (500,000 Units total) by mouth 4 (four) times a day (Patient not taking: Reported on 9/17/2021), Disp: 60 mL, Rfl: 0    Current Allergies     Allergies as of 06/23/2024 - Reviewed 06/23/2024   Allergen Reaction Noted    Morphine and codeine Hives 10/09/2016            The following portions of the patient's history were reviewed and updated as appropriate: allergies, current medications, past family history, past medical history, past social history, past surgical history and problem list.     Past Medical History:   Diagnosis Date    Anxiety 3/10/2015    CMV (cytomegalovirus infection) (HCC)     age 9    Colon polyp     Drug addiction (HCC)     Family hx of colon cancer     mother       Past Surgical History:  "  Procedure Laterality Date    COLONOSCOPY      ELBOW SURGERY Right     TONSILECTOMY AND ADNOIDECTOMY      TONSILLECTOMY      TRICEPS TENDON RELEASE Right        Family History   Problem Relation Age of Onset    Diabetes Mother     Lupus Mother     Rheumatologic disease Mother     Colon cancer Mother     No Known Problems Father     Hypertension Sister     Cancer Neg Hx          Medications have been verified.        Objective   /73   Pulse 76   Temp (!) 97.3 °F (36.3 °C)   Resp 18   Ht 5' 4\" (1.626 m)   Wt 67.1 kg (148 lb)   SpO2 99%   BMI 25.40 kg/m²        Physical Exam     Physical Exam  Constitutional:       General: She is not in acute distress.     Appearance: Normal appearance.   HENT:      Head: Normocephalic and atraumatic.      Right Ear: Swelling present. There is mastoid tenderness. Tympanic membrane is not erythematous or bulging.      Left Ear: No swelling. No mastoid tenderness. Tympanic membrane is not erythematous or bulging.      Ears:      Comments: Tenderness of the right mastoid with swelling and warm to touch.  Minimal erythema of the right auditory canal but otherwise no discharge or TM abnormality.  No associated lymphadenopathy.  Eyes:      Extraocular Movements: Extraocular movements intact.      Conjunctiva/sclera: Conjunctivae normal.   Cardiovascular:      Rate and Rhythm: Normal rate.   Pulmonary:      Effort: Pulmonary effort is normal. No respiratory distress.   Skin:     General: Skin is warm and dry.   Neurological:      General: No focal deficit present.      Mental Status: She is alert and oriented to person, place, and time.   Psychiatric:         Mood and Affect: Mood normal.         Behavior: Behavior normal.           "
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
## 2024-06-26 ENCOUNTER — TELEPHONE (OUTPATIENT)
Dept: OBGYN CLINIC | Facility: CLINIC | Age: 29
End: 2024-06-26